# Patient Record
Sex: MALE | Race: WHITE
[De-identification: names, ages, dates, MRNs, and addresses within clinical notes are randomized per-mention and may not be internally consistent; named-entity substitution may affect disease eponyms.]

---

## 2017-06-12 ENCOUNTER — HOSPITAL ENCOUNTER (EMERGENCY)
Dept: HOSPITAL 17 - PHED | Age: 72
Discharge: HOME | End: 2017-06-12
Payer: COMMERCIAL

## 2017-06-12 VITALS
DIASTOLIC BLOOD PRESSURE: 69 MMHG | HEART RATE: 70 BPM | OXYGEN SATURATION: 99 % | RESPIRATION RATE: 18 BRPM | SYSTOLIC BLOOD PRESSURE: 137 MMHG

## 2017-06-12 VITALS
DIASTOLIC BLOOD PRESSURE: 59 MMHG | TEMPERATURE: 97.9 F | SYSTOLIC BLOOD PRESSURE: 110 MMHG | OXYGEN SATURATION: 96 % | RESPIRATION RATE: 17 BRPM | HEART RATE: 61 BPM

## 2017-06-12 VITALS
SYSTOLIC BLOOD PRESSURE: 129 MMHG | DIASTOLIC BLOOD PRESSURE: 75 MMHG | RESPIRATION RATE: 18 BRPM | HEART RATE: 76 BPM | OXYGEN SATURATION: 99 %

## 2017-06-12 VITALS
HEART RATE: 82 BPM | OXYGEN SATURATION: 98 % | DIASTOLIC BLOOD PRESSURE: 67 MMHG | RESPIRATION RATE: 18 BRPM | SYSTOLIC BLOOD PRESSURE: 117 MMHG

## 2017-06-12 VITALS — HEIGHT: 76 IN | WEIGHT: 213.85 LBS | BODY MASS INDEX: 26.04 KG/M2

## 2017-06-12 VITALS
OXYGEN SATURATION: 97 % | RESPIRATION RATE: 18 BRPM | HEART RATE: 85 BPM | SYSTOLIC BLOOD PRESSURE: 117 MMHG | DIASTOLIC BLOOD PRESSURE: 67 MMHG

## 2017-06-12 DIAGNOSIS — G25.81: ICD-10-CM

## 2017-06-12 DIAGNOSIS — G62.9: ICD-10-CM

## 2017-06-12 DIAGNOSIS — R63.4: ICD-10-CM

## 2017-06-12 DIAGNOSIS — R10.10: Primary | ICD-10-CM

## 2017-06-12 LAB
ALP SERPL-CCNC: 53 U/L (ref 45–117)
ALT SERPL-CCNC: 24 U/L (ref 12–78)
ANION GAP SERPL CALC-SCNC: 9 MEQ/L (ref 5–15)
AST SERPL-CCNC: 13 U/L (ref 15–37)
BACTERIA #/AREA URNS HPF: (no result) /HPF
BASOPHILS # BLD AUTO: 0 TH/MM3 (ref 0–0.2)
BASOPHILS NFR BLD: 0.2 % (ref 0–2)
BILIRUB SERPL-MCNC: 0.9 MG/DL (ref 0.2–1)
BUN SERPL-MCNC: 12 MG/DL (ref 7–18)
CHLORIDE SERPL-SCNC: 101 MEQ/L (ref 98–107)
COLOR UR: YELLOW
COMMENT (UR): (no result)
CULTURE IF INDICATED: (no result)
EOSINOPHIL # BLD: 0 TH/MM3 (ref 0–0.4)
EOSINOPHIL NFR BLD: 0.2 % (ref 0–4)
ERYTHROCYTE [DISTWIDTH] IN BLOOD BY AUTOMATED COUNT: 12.5 % (ref 11.6–17.2)
GFR SERPLBLD BASED ON 1.73 SQ M-ARVRAT: 84 ML/MIN (ref 89–?)
GLUCOSE UR STRIP-MCNC: (no result) MG/DL
HCO3 BLD-SCNC: 27.3 MEQ/L (ref 21–32)
HCT VFR BLD CALC: 44.9 % (ref 39–51)
HEMO FLAGS: (no result)
HGB UR QL STRIP: (no result)
KETONES UR STRIP-MCNC: (no result) MG/DL
LEUKOCYTE ESTERASE UR QL STRIP: (no result) /HPF (ref 0–5)
LYMPHOCYTES # BLD AUTO: 0.4 TH/MM3 (ref 1–4.8)
LYMPHOCYTES NFR BLD AUTO: 7.4 % (ref 9–44)
MCH RBC QN AUTO: 30.8 PG (ref 27–34)
MCHC RBC AUTO-ENTMCNC: 33.1 % (ref 32–36)
MCV RBC AUTO: 93.1 FL (ref 80–100)
METHOD OF COLLECTION: (no result)
MONOCYTES NFR BLD: 11.8 % (ref 0–8)
MUCOUS THREADS #/AREA URNS LPF: (no result) /LPF
NEUTROPHILS # BLD AUTO: 3.9 TH/MM3 (ref 1.8–7.7)
NEUTROPHILS NFR BLD AUTO: 80.4 % (ref 16–70)
NITRITE UR QL STRIP: (no result)
PLATELET # BLD: 205 TH/MM3 (ref 150–450)
POTASSIUM SERPL-SCNC: 4 MEQ/L (ref 3.5–5.1)
RBC # BLD AUTO: 4.83 MIL/MM3 (ref 4.5–5.9)
RBC #/AREA URNS HPF: (no result) /HPF (ref 0–3)
SODIUM SERPL-SCNC: 137 MEQ/L (ref 136–145)
SP GR UR STRIP: 1.02 (ref 1–1.03)
SQUAMOUS #/AREA URNS HPF: (no result) /HPF (ref 0–5)
WBC # BLD AUTO: 4.9 TH/MM3 (ref 4–11)

## 2017-06-12 PROCEDURE — 99285 EMERGENCY DEPT VISIT HI MDM: CPT

## 2017-06-12 PROCEDURE — 74177 CT ABD & PELVIS W/CONTRAST: CPT

## 2017-06-12 PROCEDURE — 85025 COMPLETE CBC W/AUTO DIFF WBC: CPT

## 2017-06-12 PROCEDURE — 81001 URINALYSIS AUTO W/SCOPE: CPT

## 2017-06-12 PROCEDURE — 83690 ASSAY OF LIPASE: CPT

## 2017-06-12 PROCEDURE — 80053 COMPREHEN METABOLIC PANEL: CPT

## 2017-06-12 PROCEDURE — 93005 ELECTROCARDIOGRAM TRACING: CPT

## 2017-06-12 NOTE — PD
HPI


Chief Complaint:  General Weakness


Time Seen by Provider:  10:46


Travel History


International Travel<30 days:  No


Contact w/Intl Traveler<30days:  No


Traveled to known affect area:  No





History of Present Illness


HPI


Patient is a 72-year-old male with history of restless leg syndrome/neuropathy 

and depression here with complaint of generalized fatigue/malaise, anorexia and 

weight loss.  For the last month patient has felt poorly.  Notes generalized 

fatigue, anorexia.  He's had an unintentional 30 pound weight loss.  He also 

notes fullness/discomfort in the upper abdomen, right slightly greater than 

left.  No noted jaundice, nausea, change in stool habits.  He denies any 

history of hepatobiliary pathology.  No changes in medications.  No nocturnal 

sweats, fevers or chills





PFSH


Past Medical History


Depression:  Yes


Cardiovascular Problems:  Yes (IDIOPATHIC NEUROPATHY BILAT LEGS)


Musculoskeletal:  Yes (RLS)


Influenza Vaccination:  Yes





Past Surgical History


Surgical History:  No Previous Surgery





Social History


Alcohol Use:  No


Tobacco Use:  No


Substance Use:  No





Allergies-Medications


(Allergen,Severity, Reaction):  


Coded Allergies:  


     Sulfa (Verified  Allergy, Intermediate, vomiting, 6/12/17)


Reported Meds & Prescriptions





Reported Meds & Active Scripts


Active


Reported


Duloxetine DR (Duloxetine HCl) 30 Mg Capdr 30 Mg PO BID


Montelukast (Montelukast Sodium) 10 Mg Tab 10 Mg PO HS


Atorvastatin (Atorvastatin Calcium) 40 Mg Tab 40 Mg PO HS


Lorazepam 1 Mg Tab 1 Mg PO Q8H PRN


Zolpidem (Zolpidem Tartrate) 10 Mg Tab 10 Mg PO HS


Trazodone (Trazodone HCl) 100 Mg Tablet 100 Mg PO HS








Review of Systems


Except as stated in HPI:  all other systems reviewed are Neg





Physical Exam


Narrative


GENERAL: Well-appearing male in no acute distress


SKIN: Focused skin assessment warm/dry.


HEAD:  Normocephalic. 


EYES:  No scleral icterus. No injection or drainage. 


ENT: No nasal bleeding or discharge.  Mucous membranes pink and moist.


NECK: Supple


CARDIOVASCULAR: Regular rate and rhythm.  No murmur appreciated.


RESPIRATORY: No accessory muscle use. Clear to auscultation. Breath sounds 

equal bilaterally. 


GASTROINTESTINAL: Abdomen soft, mild upper abdominal tenderness to palpation in 

the epigastrium and right upper quadrant without rebound or guarding, no 

hepatosplenomegaly


MUSCULOSKELETAL: normal gait


NEUROLOGICAL: Awake and alert.  Motor grossly within normal limits. Normal 

speech.


PSYCHIATRIC: Appropriate mood and affect; insight and judgment normal.





Data


Data


Last Documented VS





Vital Signs








  Date Time  Temp Pulse Resp B/P Pulse Ox O2 Delivery O2 Flow Rate FiO2


 


6/12/17 12:47  70 18 137/69 99 Room Air  


 


6/12/17 10:01 97.9       








Orders





 Electrocardiogram (6/12/17 10:39)


Complete Blood Count With Diff (6/12/17 10:39)


Urinalysis - C+S If Indicated (6/12/17 10:39)


Ecg Monitoring (6/12/17 10:39)


Iv Access Insert/Monitor (6/12/17 10:39)


Oximetry (6/12/17 10:39)


Sodium Chloride 0.9% Flush (Ns Flush) (6/12/17 10:45)


Comprehensive Metabolic Panel (6/12/17 10:52)


Lipase (6/12/17 10:52)


Ct Abd/Pel W Iv Contrast(Rout) (6/12/17 10:52)


Diatrizoate Liq (Md Gastroview Liq) (6/12/17 10:55)


Oral Contrast - Adult (6/12/17 11:09)


Iohexol 350 Inj (Omnipaque 350 Inj) (6/12/17 12:37)





Labs





 Laboratory Tests








Test 6/12/17 6/12/17





 11:00 11:10


 


White Blood Count 4.9 TH/MM3 


 


Red Blood Count 4.83 MIL/MM3 


 


Hemoglobin 14.9 GM/DL 


 


Hematocrit 44.9 % 


 


Mean Corpuscular Volume 93.1 FL 


 


Mean Corpuscular Hemoglobin 30.8 PG 


 


Mean Corpuscular Hemoglobin 33.1 % 





Concent  


 


Red Cell Distribution Width 12.5 % 


 


Platelet Count 205 TH/MM3 


 


Mean Platelet Volume 7.2 FL 


 


Neutrophils (%) (Auto) 80.4 % 


 


Lymphocytes (%) (Auto) 7.4 % 


 


Monocytes (%) (Auto) 11.8 % 


 


Eosinophils (%) (Auto) 0.2 % 


 


Basophils (%) (Auto) 0.2 % 


 


Neutrophils # (Auto) 3.9 TH/MM3 


 


Lymphocytes # (Auto) 0.4 TH/MM3 


 


Monocytes # (Auto) 0.6 TH/MM3 


 


Eosinophils # (Auto) 0.0 TH/MM3 


 


Basophils # (Auto) 0.0 TH/MM3 


 


CBC Comment DIFF FINAL  


 


Differential Comment   


 


Sodium Level 137 MEQ/L 


 


Potassium Level 4.0 MEQ/L 


 


Chloride Level 101 MEQ/L 


 


Carbon Dioxide Level 27.3 MEQ/L 


 


Anion Gap 9 MEQ/L 


 


Blood Urea Nitrogen 12 MG/DL 


 


Creatinine 0.89 MG/DL 


 


Estimat Glomerular Filtration 84 ML/MIN 





Rate  


 


Random Glucose 131 MG/DL 


 


Calcium Level 9.3 MG/DL 


 


Total Bilirubin 0.9 MG/DL 


 


Aspartate Amino Transf 13 U/L 





(AST/SGOT)  


 


Alanine Aminotransferase 24 U/L 





(ALT/SGPT)  


 


Alkaline Phosphatase 53 U/L 


 


Total Protein 6.5 GM/DL 


 


Albumin 3.8 GM/DL 


 


Lipase 256 U/L 


 


Urine Collection Type  VOIDED 


 


Urine Color  YELLOW 


 


Urine Turbidity  CLEAR 


 


Urine pH  6.0 


 


Urine Specific Gravity  1.024 


 


Urine Protein  NEG mg/dL


 


Urine Glucose (UA)  NEG mg/dL


 


Urine Ketones  NEG mg/dL


 


Urine Occult Blood  TRACE 


 


Urine Nitrite  NEG 


 


Urine Bilirubin  NEG 


 


Urine Leukocyte Esterase  NEG 


 


Urine RBC  0-3 /hpf


 


Urine WBC  0-2 /hpf


 


Urine Squamous Epithelial  0-2 /hpf





Cells  


 


Urine Bacteria  RARE /hpf


 


Urine Mucus  MANY /lpf


 


Microscopic Urinalysis Comment  CULT NOT





  INDICATED











MDM


Medical Decision Making


Medical Screen Exam Complete:  Yes


Emergency Medical Condition:  Yes


Medical Record Reviewed:  Yes


Differential Diagnosis


72-year-old male with history of depression here with 1 month of generalized 

fatigue/malaise, anorexia and unintentional 30 pound weight loss with upper 

abdominal discomfort.  Differential includes gastritis, pancreatitis, 

hepatobiliary pathology, mass within the colon, pancreatitis, liver cancer, 

dehydration, electrolyte abnormality, symptomatic anemia, depression


Narrative Course


Patient placed on monitor, IV established and blood obtained.  A twelve-lead 

EKG shows sinus rhythm without notable ST or T-wave abnormalities and normal 

intervals.  CBC, CMP, lipase and urinalysis were unremarkable.  CT of the 

abdomen and pelvis was normal.  Patient reassured and encouraged to establish 

care with outpatient primary care provider for further workup and management of 

his symptoms.





Diagnosis





 Primary Impression:  


 Unintentional weight loss


 Additional Impression:  


 Abdominal pain


 Qualified Code:  R10.10 - Pain of upper abdomen


Referrals:  


[facility]


call for appointment





Primary Care Physician


call for appointment





***Additional Instructions:


Laboratory workup and CT of the abdomen and pelvis today was normal.  





Follow-up with primary care provider for further outpatient workup


***Med/Other Pt SpecificInfo:  No Change to Meds


Disposition:  01 DISCHARGE HOME


Condition:  Stable








Jewell Sarmiento MD Jun 12, 2017 13:04

## 2017-06-12 NOTE — RADHPO
EXAM DATE/TIME:  06/12/2017 12:25 

 

[facility] COMPARISON:     

No previous studies available for comparison.

 

 

INDICATIONS :     

Thirty pound weight loss within one month.

                      

 

IV CONTRAST:     

95 cc Omnipaque 350 (iohexol) IV 

 

 

ORAL CONTRAST:      

Prescribed oral contrast ingested.

                      

 

RADIATION DOSE:     

18.19 CTDIvol (mGy) 

 

 

MEDICAL HISTORY :      

None  

 

SURGICAL HISTORY :       

None. 

 

ENCOUNTER:      

Initial

 

ACUITY:      

1 month

 

PAIN SCALE:      

0/10

 

LOCATION:       

Abdomen/pelvis

 

TECHNIQUE:     

Volumetric scanning of the abdomen and pelvis was performed.  Using automated exposure control and ad
justment of the mA and/or kV according to patient size, radiation dose was kept as low as reasonably 
achievable to obtain optimal diagnostic quality images. 

 

FINDINGS:     

Lung bases are clear.  Liver is free of focal defects.  Spleen contains granulomas.  Pancreas, adrena
ls and kidneys are unremarkable.  There is no ascites or adenopathy appreciated.  

 

Pelvic contents are unremarkable.  

 

CONCLUSION:     

1.  I do not see an etiology for the 30-pound weight loss. 

2.  There is collateralization from a supreme intercostal vein to the azygous vein.  

 

 

 Linden Lopes MD FACR on June 12, 2017 at 12:42                

Board Certified Radiologist.

 This report was verified electronically.

## 2017-06-12 NOTE — EKG
Date Performed: 06/12/2017       Time Performed: 10:50:12

 

PTAGE:      72 years

 

EKG:      Sinus rhythm 

 

 NORMAL ECG 

 

NO PREVIOUS TRACING            

 

DOCTOR:   Jabier Torres  Interpretating Date/Time  06/12/2017 13:37:54

## 2017-07-17 ENCOUNTER — HOSPITAL ENCOUNTER (INPATIENT)
Dept: HOSPITAL 17 - PHED | Age: 72
LOS: 1 days | Discharge: HOME | DRG: 641 | End: 2017-07-18
Attending: HOSPITALIST | Admitting: HOSPITALIST
Payer: COMMERCIAL

## 2017-07-17 VITALS
OXYGEN SATURATION: 96 % | RESPIRATION RATE: 16 BRPM | HEART RATE: 96 BPM | DIASTOLIC BLOOD PRESSURE: 94 MMHG | SYSTOLIC BLOOD PRESSURE: 169 MMHG

## 2017-07-17 VITALS
SYSTOLIC BLOOD PRESSURE: 99 MMHG | HEART RATE: 99 BPM | OXYGEN SATURATION: 95 % | DIASTOLIC BLOOD PRESSURE: 67 MMHG | RESPIRATION RATE: 20 BRPM | TEMPERATURE: 97.8 F

## 2017-07-17 VITALS
RESPIRATION RATE: 16 BRPM | OXYGEN SATURATION: 96 % | DIASTOLIC BLOOD PRESSURE: 72 MMHG | SYSTOLIC BLOOD PRESSURE: 120 MMHG | HEART RATE: 83 BPM

## 2017-07-17 VITALS — BODY MASS INDEX: 23.6 KG/M2 | HEIGHT: 76 IN | WEIGHT: 193.79 LBS

## 2017-07-17 VITALS
OXYGEN SATURATION: 99 % | RESPIRATION RATE: 16 BRPM | SYSTOLIC BLOOD PRESSURE: 160 MMHG | DIASTOLIC BLOOD PRESSURE: 83 MMHG | HEART RATE: 85 BPM

## 2017-07-17 VITALS — HEART RATE: 93 BPM | TEMPERATURE: 97.8 F | DIASTOLIC BLOOD PRESSURE: 88 MMHG | SYSTOLIC BLOOD PRESSURE: 147 MMHG

## 2017-07-17 VITALS — OXYGEN SATURATION: 97 % | DIASTOLIC BLOOD PRESSURE: 66 MMHG | HEART RATE: 84 BPM | SYSTOLIC BLOOD PRESSURE: 114 MMHG

## 2017-07-17 VITALS — OXYGEN SATURATION: 98 % | SYSTOLIC BLOOD PRESSURE: 98 MMHG | HEART RATE: 87 BPM | DIASTOLIC BLOOD PRESSURE: 55 MMHG

## 2017-07-17 VITALS — OXYGEN SATURATION: 96 %

## 2017-07-17 DIAGNOSIS — R29.6: ICD-10-CM

## 2017-07-17 DIAGNOSIS — E46: ICD-10-CM

## 2017-07-17 DIAGNOSIS — K59.00: ICD-10-CM

## 2017-07-17 DIAGNOSIS — G25.81: ICD-10-CM

## 2017-07-17 DIAGNOSIS — F33.1: ICD-10-CM

## 2017-07-17 DIAGNOSIS — G60.9: ICD-10-CM

## 2017-07-17 DIAGNOSIS — I95.9: ICD-10-CM

## 2017-07-17 DIAGNOSIS — R62.7: ICD-10-CM

## 2017-07-17 DIAGNOSIS — E86.0: Primary | ICD-10-CM

## 2017-07-17 DIAGNOSIS — R10.9: ICD-10-CM

## 2017-07-17 LAB
ALP SERPL-CCNC: 71 U/L (ref 45–117)
ALT SERPL-CCNC: 93 U/L (ref 12–78)
ANION GAP SERPL CALC-SCNC: 8 MEQ/L (ref 5–15)
APTT BLD: 24.6 SEC (ref 24.3–30.1)
AST SERPL-CCNC: 37 U/L (ref 15–37)
BASOPHILS # BLD AUTO: 0 TH/MM3 (ref 0–0.2)
BASOPHILS NFR BLD: 0.2 % (ref 0–2)
BILIRUB SERPL-MCNC: 1.2 MG/DL (ref 0.2–1)
BUN SERPL-MCNC: 19 MG/DL (ref 7–18)
CHLORIDE SERPL-SCNC: 104 MEQ/L (ref 98–107)
COLOR UR: (no result)
COMMENT (UR): (no result)
CULTURE IF INDICATED: (no result)
EOSINOPHIL # BLD: 0 TH/MM3 (ref 0–0.4)
EOSINOPHIL NFR BLD: 0.1 % (ref 0–4)
ERYTHROCYTE [DISTWIDTH] IN BLOOD BY AUTOMATED COUNT: 12.9 % (ref 11.6–17.2)
GFR SERPLBLD BASED ON 1.73 SQ M-ARVRAT: 87 ML/MIN (ref 89–?)
GLUCOSE UR STRIP-MCNC: (no result) MG/DL
HCO3 BLD-SCNC: 26.1 MEQ/L (ref 21–32)
HCT VFR BLD CALC: 47 % (ref 39–51)
HEMO FLAGS: (no result)
HGB UR QL STRIP: (no result)
INR PPP: 1 RATIO
KETONES UR STRIP-MCNC: (no result) MG/DL
LEUKOCYTE ESTERASE UR QL STRIP: (no result) /HPF (ref 0–5)
LYMPHOCYTES # BLD AUTO: 0.4 TH/MM3 (ref 1–4.8)
LYMPHOCYTES NFR BLD AUTO: 7.1 % (ref 9–44)
MCH RBC QN AUTO: 29.9 PG (ref 27–34)
MCHC RBC AUTO-ENTMCNC: 32.5 % (ref 32–36)
MCV RBC AUTO: 92 FL (ref 80–100)
MONOCYTES NFR BLD: 9.5 % (ref 0–8)
MUCOUS THREADS #/AREA URNS LPF: (no result) /LPF
NEUTROPHILS # BLD AUTO: 4.9 TH/MM3 (ref 1.8–7.7)
NEUTROPHILS NFR BLD AUTO: 83.1 % (ref 16–70)
NITRITE UR QL STRIP: (no result)
PLATELET # BLD: 209 TH/MM3 (ref 150–450)
POTASSIUM SERPL-SCNC: 4.1 MEQ/L (ref 3.5–5.1)
PROTHROMBIN TIME: 10.7 SEC (ref 9.8–11.6)
RBC # BLD AUTO: 5.11 MIL/MM3 (ref 4.5–5.9)
RBC #/AREA URNS HPF: (no result) /HPF (ref 0–3)
SCAN/DIFF: (no result)
SODIUM SERPL-SCNC: 138 MEQ/L (ref 136–145)
SP GR UR STRIP: 1.03 (ref 1–1.03)
SQUAMOUS #/AREA URNS HPF: (no result) /HPF (ref 0–5)
WBC # BLD AUTO: 5.9 TH/MM3 (ref 4–11)

## 2017-07-17 PROCEDURE — 80053 COMPREHEN METABOLIC PANEL: CPT

## 2017-07-17 PROCEDURE — 81001 URINALYSIS AUTO W/SCOPE: CPT

## 2017-07-17 PROCEDURE — 85730 THROMBOPLASTIN TIME PARTIAL: CPT

## 2017-07-17 PROCEDURE — 83690 ASSAY OF LIPASE: CPT

## 2017-07-17 PROCEDURE — 74000: CPT

## 2017-07-17 PROCEDURE — 74177 CT ABD & PELVIS W/CONTRAST: CPT

## 2017-07-17 PROCEDURE — 85610 PROTHROMBIN TIME: CPT

## 2017-07-17 PROCEDURE — 85025 COMPLETE CBC W/AUTO DIFF WBC: CPT

## 2017-07-17 PROCEDURE — 80048 BASIC METABOLIC PNL TOTAL CA: CPT

## 2017-07-17 PROCEDURE — 84443 ASSAY THYROID STIM HORMONE: CPT

## 2017-07-17 PROCEDURE — 70450 CT HEAD/BRAIN W/O DYE: CPT

## 2017-07-17 PROCEDURE — 93005 ELECTROCARDIOGRAM TRACING: CPT

## 2017-07-17 RX ADMIN — DULOXETINE HYDROCHLORIDE SCH MG: 30 CAPSULE, DELAYED RELEASE ORAL at 22:42

## 2017-07-17 RX ADMIN — Medication SCH TAB: at 22:42

## 2017-07-17 RX ADMIN — DOCUSATE SODIUM SCH MG: 100 CAPSULE, LIQUID FILLED ORAL at 22:42

## 2017-07-17 RX ADMIN — WATER SCH ML: 1 IRRIGANT IRRIGATION at 22:42

## 2017-07-17 RX ADMIN — PHENYTOIN SODIUM SCH MLS/HR: 50 INJECTION INTRAMUSCULAR; INTRAVENOUS at 20:57

## 2017-07-17 NOTE — RADRPT
EXAM DATE/TIME:  07/17/2017 17:19 

 

[facility] COMPARISON:     

No previous studies available for comparison.

 

 

INDICATIONS :     

Increased weakness. Abdominal pain.

                      

 

IV CONTRAST:     

95 cc Omnipaque 350 (iohexol) IV 

 

 

ORAL CONTRAST:      

No oral contrast ingested.

                      

 

RADIATION DOSE:     

19.79 CTDIvol (mGy) 

 

 

MEDICAL HISTORY :     

None  

 

SURGICAL HISTORY :      

None. 

 

ENCOUNTER:      

Initial

 

ACUITY:      

2 months

 

PAIN SCALE:      

2/10

 

LOCATION:         

abdomen

 

TECHNIQUE:     

Volumetric scanning of the abdomen and pelvis was performed.  Using automated exposure control and ad
justment of the mA and/or kV according to patient size, radiation dose was kept as low as reasonably 
achievable to obtain optimal diagnostic quality images.  DICOM format image data is available electro
nically for review and comparison.  

 

FINDINGS:     

 

LOWER LUNGS:     

The visualized lower lungs are clear. Epicardial pacer wires in good position

 

LIVER:     

Homogeneous density without lesion.  There is no dilation of the biliary tree.  No calcified gallston
es.

 

SPLEEN:     

Normal size without lesion. Numerous granuloma

 

PANCREAS:     

Within normal limits.

 

KIDNEYS:     

Normal in size and shape.  There is no mass, stone or hydronephrosis.

 

ADRENAL GLANDS:     

Within normal limits.

 

VASCULAR:     

There is no aortic aneurysm.

 

BOWEL/MESENTERY:     

The stomach, small bowel, and colon demonstrate no acute abnormality.  There is no free intraperitone
al air or fluid.

 

ABDOMINAL WALL:     

Within normal limits.

 

RETROPERITONEUM:     

There is no lymphadenopathy. 

 

BLADDER:     

No wall thickening or mass. 

 

REPRODUCTIVE:     

Within normal limits.

 

INGUINAL:     

There is no lymphadenopathy or hernia. 

 

MUSCULOSKELETAL:     

Within normal limits for patient age. 

 

CONCLUSION:     

Normal examination. Moderate stool throughout the colon. 

 

 

 

 Sheldon Flores MD on July 17, 2017 at 17:39           

Board Certified Radiologist.

 This report was verified electronically.

## 2017-07-17 NOTE — HHI.HP
__________________________________________________





[facility]


Service


[facility]


Primary Care Physician


Unknown


Admission Diagnosis


hypotension, failure to thrive, falls, depression


Diagnoses:  


Chief Complaint:  


Weakness and abdominal pain


Travel History


International Travel<30 Days:  No


Contact w/Intl Traveler <30 Da:  No


Traveled to Known Affected Are:  No


History of Present Illness


This patient is a 72-year-old gentleman with history of depression who has come 

to the hospital with 2 weeks of no bowel movements despite taking MiraLAX and 

suppositories at home.  He has been generally weak due to not eating.  He says 

his stomach feels full.  His pain is moderate and not improved with any 

measures.  His wife accompanies him and says he's lost at least 7 pounds in a 

week from not eating.  He has been nauseated but not vomiting.  Patient denies 

any fevers or chills.  He says that he has been sleeping more and his wife is 

concerned that he is unmotivated and has not been out of the house in 20 days.  

Normally the patient takes trazodone, zolpidem and Eloxatin as well as 

lorazepam.  His primary doctor for anxiety and depression.  He has been 

sleeping poorly..  Patient denies any suicidal or homicidal ideation.  The 

patient is severely dehydrated on exam with some tenting of his skin and dry 

mucous membranes.  He is hypotensive.  Patient will need to be admitted to the 

hospital for further evaluation of severe dehydration and malnourishment





Review of Systems


Constitutional:  COMPLAINS OF: Weight loss (unintentional and due to not 

eating.  Patient),  DENIES: Diaphoretic episodes, Fatigue, Fever, Weight gain, 

Chills, Dizziness, Change in appetite, Night Sweats


Endocrine:  DENIES: Heat/cold intolerance, Polydipsia, Polyuria, Polyphagia


Eyes:  DENIES: Blurred vision, Diplopia, Eye inflammation, Eye pain, Vision loss

, Photosensitivity, Double Vision


Ears, nose, mouth, throat:  DENIES: Tinnitus, Hearing loss, Vertigo, Nasal 

discharge, Oral lesions, Throat pain, Hoarseness, Ear Pain, Running Nose, 

Epistaxis, Sinus Pain, Toothache, Odynophagia


Respiratory:  DENIES: Apneas, Cough, Snoring, Wheezing, Hemoptysis, Sputum 

production, Shortness of breath


Cardiovascular:  DENIES: Chest pain, Palpitations, Syncope, Dyspnea on Exertion

, PND, Lower Extremity Edema, Orthopnea, Claudication


Gastrointestinal:  COMPLAINS OF: Abdominal pain, Constipation, Nausea


Genitourinary:  DENIES: Sexual dysfunction, Urinary frequency, Urinary 

incontinence, Urgency, Hematuria, Dysuria, Nocturia, Penile Discharge, 

Testicular Pain, Testicular Swelling


Musculoskeletal:  DENIES: Joint pain, Muscle aches, Stiffness, Joint Swelling, 

Back pain, Neck pain


Integumentary:  DENIES: Abnormal pigmentation, Nail changes, Pruritus, Rash


Hematologic/lymphatic:  DENIES: Bruising, Lymphadenopathy


Immunologic/allergic:  DENIES: Eczema, Urticaria


Neurologic:  DENIES: Abnormal gait, Headache, Localized weakness, Paresthesias, 

Seizures, Speech Problems, Tremor, Poor Balance


Psychiatric:  COMPLAINS OF: Anxiety, Depression (patient denies suicidal or 

homicidal ideation),  DENIES: Confusion, Mood changes, Hallucinations, Agitation

, Suicidal Ideation, Homicidal Ideation, Delusions





Past Family Social History


Past Medical History


Depression


Hyperlipidemia


Neuropathy 


history of melanoma


Past Surgical History


Anxiety


Reported Medications


Reviewed in the medical record, patient does not take his statin


Allergies:  


Coded Allergies:  


     Sulfa (Verified  Allergy, Intermediate, vomiting, 17)


Active Ordered Medications


Reviewed in the medical record


Family History


Mother  at 92 and Alzheimer's, father  at 92


Social History


, no tobacco or alcohol dependency





Physical Exam


Vital Signs





 Vital Signs








  Date Time  Temp Pulse Resp B/P Pulse Ox O2 Delivery O2 Flow Rate FiO2


 


17 18:13  87  98/55 98   


 


17 17:18  84  114/66 97   


 


17 16:10     96   


 


17 15:04 97.8 99 20 99/67 95   








Physical Exam


GENERAL: This is a well-nourished, well-developed patient, with flat affect


SKIN: Skin is cool and dry and there is multiple areas of hypopigmentation was 

a patient says his vitiligo


HEAD: Atraumatic. Normocephalic. No temporal or scalp tenderness.


EYES: Pupils equal round and reactive. Extraocular motions intact. No scleral 

icterus. No injection or drainage. 


ENT: Nose without bleeding, purulent drainage or septal hematoma. Throat 

without erythema, tonsillar hypertrophy or exudate. Uvula midline. Airway 

patent.


NECK: Trachea midline. No JVD or lymphadenopathy. Supple, nontender, no 

meningeal signs.


CARDIOVASCULAR: Regular rate and rhythm without murmurs, gallops, or rubs. 


RESPIRATORY: Clear to auscultation. Breath sounds equal bilaterally. No wheezes

, rales, or rhonchi.  


GASTROINTESTINAL: Abdomen soft, non-tender, mildly distended hypoactive bowel 

sounds. No hepato-splenomegaly, or palpable masses. No guarding.


MUSCULOSKELETAL: Extremities without clubbing, cyanosis, or edema. No joint 

tenderness, effusion, or edema noted. No calf tenderness. Negative Homans sign 

bilaterally.


NEUROLOGICAL: Awake and alert. Cranial nerves II through XII intact.  Motor and 

sensory grossly within normal limits. Five out of 5 muscle strength in all 

muscle groups.  Normal speech.


Laboratory





Laboratory Tests








Test 17





 16:27 17:49


 


White Blood Count 5.9  


 


Red Blood Count 5.11  


 


Hemoglobin 15.3  


 


Hematocrit 47.0  


 


Mean Corpuscular Volume 92.0  


 


Mean Corpuscular Hemoglobin 29.9  


 


Mean Corpuscular Hemoglobin 32.5  





Concent  


 


Red Cell Distribution Width 12.9  


 


Platelet Count 209  


 


Mean Platelet Volume 8.5  


 


Neutrophils (%) (Auto) 83.1  


 


Lymphocytes (%) (Auto) 7.1  


 


Monocytes (%) (Auto) 9.5  


 


Eosinophils (%) (Auto) 0.1  


 


Basophils (%) (Auto) 0.2  


 


Neutrophils # (Auto) 4.9  


 


Lymphocytes # (Auto) 0.4  


 


Monocytes # (Auto) 0.6  


 


Eosinophils # (Auto) 0.0  


 


Basophils # (Auto) 0.0  


 


CBC Comment AUTO DIFF  


 


Differential Comment AUTO DIFF 





 CONFIRMED 


 


Prothrombin Time 10.7  


 


Prothromb Time International 1.0  





Ratio  


 


Activated Partial 24.6  





Thromboplast Time  


 


Sodium Level 138  


 


Potassium Level 4.1  


 


Chloride Level 104  


 


Carbon Dioxide Level 26.1  


 


Anion Gap 8  


 


Blood Urea Nitrogen 19  


 


Creatinine 0.87  


 


Estimat Glomerular Filtration 87  





Rate  


 


Random Glucose 92  


 


Calcium Level 9.4  


 


Total Bilirubin 1.2  


 


Aspartate Amino Transf 37  





(AST/SGOT)  


 


Alanine Aminotransferase 93  





(ALT/SGPT)  


 


Alkaline Phosphatase 71  


 


Total Protein 7.0  


 


Albumin 3.8  


 


Lipase 138  


 


Urine pH  6.0 


 


Urine Protein  NEG 


 


Urine Glucose (UA)  NEG 


 


Urine Ketones  TRACE 


 


Urine Occult Blood  NEG 


 


Urine Nitrite  NEG 


 


Urine Bilirubin  NEG 


 


Urine Leukocyte Esterase  NEG 








Result Diagram:  


17





Imaging





Last Impressions








Abdomen/Pelvis CT 17 1607 Signed





Impressions: 





 Service Date/Time:  2017 17:19 - CONCLUSION:  Normal 





 examination. Moderate stool throughout the colon.      Sheldon Flores MD 


 


Head CT 17 0000 Signed





Impressions: 





 Service Date/Time:  Monday, 2017 17:15 - CONCLUSION:  Normal 





 examination.       Sheldon Flores MD 











Assessment and Plan


Problem List:  


(1) Hypotension


ICD Code:  I95.9


Status:  Acute


Plan:  Patient is severely dehydrated clinically and will need IV hydration.  

He appears to be malnourished and we will consult nutrition





(2) Depression


ICD Code:  F32.9


Status:  Acute


Plan:  Patient on Cymbalta and multiple benzos/sleep aides


Patient with increased anhedonia, appetite and flat affect on exam


Psychiatry consult pending


tsh pending





(3) Abdominal pain


ICD Code:  R10.9


Status:  Acute


Plan:  Likely due to constipation which may be medication related.  Continue 

with bowel regimen and stool softeners





(4) Obstipation


ICD Code:  K59.00


Status:  Acute


Plan:  Patient with significant decrease in bowel movements for 2 weeks and 

stool in his colon.  We will attempt medical management of this for now and 

follow for resolution





Assessment and Plan


Plan of care to be determined by Hospital course


Code Status


full code


Discussed Condition With


Patient, spouse, nursing team and ER M.D.





Physician Certification


2 Midnight Certification Type:  Admission for Inpatient Services


Order for Inpatient Services


The services are ordered in accordance with Medicare regulations or non-

Medicare payer requirements, as applicable.  In the case of services not 

specified as inpatient-only, they are appropriately provided as inpatient 

services in accordance with the 2-midnight benchmark.


Estimated LOS (days):  2


2 days is the estimated time the patient will need to remain in the hospital, 

assuming treatment plan goals are met and no additional complications.


Post-Hospital Plan:  Not yet determined





Problem Qualifiers





(1) Hypotension:  


Qualified Code:  I95.9 - Hypotension, unspecified hypotension type


(2) Depression:  


Qualified Code:  F32.9 - Depression, unspecified depression type





Bekah Nuñez MD 2017 18:56

## 2017-07-17 NOTE — PD
HPI


Chief Complaint:  General Weakness


Time Seen by Provider:  16:02


Travel History


International Travel<30 days:  No


Contact w/Intl Traveler<30days:  No


Traveled to known affect area:  No





History of Present Illness


HPI


72-year-old male with history of restless leg syndrome, neuropathy, depression, 

here with complaint of generalized fatigue and malaise, anorexia, weight loss, 

generalized weakness.  Patient was at the symptoms of [facility] [place] for last 2 

months.  His wife is here and states that he has had little appetite and has 

only been drinking Ensure.  Patient is complaining of feeling abdominal 

fullness.  He has not had fevers or chills.  No vomiting.  He reports that he 

has not had a bowel movement in several days.  Chart review shows that the 

patient was seen in the emergency department about one month ago for the same.  

At that time his charted weight was 97 kg.  Today is charted weight is 91 

kilograms.  Patient does admit to feeling depressed, however he denies suicidal 

or homicidal ideation.  He states that he fell in the shower a couple weeks ago 

and shows me a bruise on his left arm.  He states he feels too weak to walk 

around, and his wife is certainly not strong enough to take care of him at home.





PFSH


Past Medical History


Anxiety:  Yes


Depression:  Yes


Cardiovascular Problems:  Yes (IDIOPATHIC NEUROPATHY BILAT LEGS)


High Cholesterol:  Yes


Diminished Hearing:  No


Musculoskeletal:  Yes (RLS)


Tetanus Vaccination:  Unknown


Pregnant?:  Not Pregnant





Social History


Alcohol Use:  No


Tobacco Use:  No


Substance Use:  No





Allergies-Medications


(Allergen,Severity, Reaction):  


Coded Allergies:  


     Sulfa (Verified  Allergy, Intermediate, vomiting, 7/17/17)


Reported Meds & Prescriptions





Reported Meds & Active Scripts


Active


Reported


Duloxetine DR (Duloxetine HCl) 30 Mg Capdr 30 Mg PO BID


Atorvastatin (Atorvastatin Calcium) 40 Mg Tab 40 Mg PO HS


Lorazepam 1 Mg Tab 1 Mg PO Q8H PRN


Zolpidem (Zolpidem Tartrate) 10 Mg Tab 10 Mg PO HS


Trazodone (Trazodone HCl) 100 Mg Tablet 100 Mg PO HS








Review of Systems


Except as stated in HPI:  all other systems reviewed are Neg





Physical Exam


Narrative


GENERAL: Well-developed, well-nourished, awake, alert, GCS 15


SKIN: Focused skin assessment warm/dry.  Ecchymosis to medial left arm in 

various stages of healing.  No lacerations or abrasions.


HEAD: Atraumatic. Normocephalic. 


EYES: Pupils equal and round. No scleral icterus. No injection or drainage. 


ENT: Mucous membranes pink and moist.


NECK: Trachea midline. No JVD. 


CARDIOVASCULAR: Regular rate and rhythm.  


RESPIRATORY: No accessory muscle use. Clear to auscultation. Breath sounds 

equal bilaterally. 


GASTROINTESTINAL: Abdomen soft, non-tender, nondistended. 


MUSCULOSKELETAL: No obvious deformities. No clubbing.  No cyanosis.  No edema. 


NEUROLOGICAL: Awake and alert. No obvious cranial nerve deficits.  Motor 

grossly within normal limits. Normal speech.  No focal deficits.


PSYCHIATRIC: Appropriate mood and affect; insight and judgment normal.





Data


Data


Last Documented VS





Vital Signs








  Date Time  Temp Pulse Resp B/P Pulse Ox O2 Delivery O2 Flow Rate FiO2


 


7/17/17 18:13  87  98/55 98   


 


7/17/17 15:04 97.8  20     








Orders





 Complete Blood Count With Diff (7/17/17 16:07)


Comprehensive Metabolic Panel (7/17/17 16:07)


Lipase (7/17/17 16:07)


Prothrombin Time / Inr (Pt) (7/17/17 16:07)


Act Partial Throm Time (Ptt) (7/17/17 16:07)


Urinalysis - C+S If Indicated (7/17/17 16:07)


Ct Abd/Pel W Iv Contrast(Rout) (7/17/17 16:07)


Iv Access Insert/Monitor (7/17/17 16:07)


Ecg Monitoring (7/17/17 16:07)


Oximetry (7/17/17 16:07)


Sodium Chloride 0.9% Flush (Ns Flush) (7/17/17 16:15)


Electrocardiogram (7/17/17 16:07)


Ct Brain W/O Iv Contrast(Rout) (7/17/17 )


Cath For Specimen (7/17/17 17:20)


Iohexol 350 Inj (Omnipaque 350 Inj) (7/17/17 17:32)





Labs





 Laboratory Tests








Test 7/17/17 7/17/17





 16:27 17:49


 


White Blood Count 5.9 TH/MM3 


 


Red Blood Count 5.11 MIL/MM3 


 


Hemoglobin 15.3 GM/DL 


 


Hematocrit 47.0 % 


 


Mean Corpuscular Volume 92.0 FL 


 


Mean Corpuscular Hemoglobin 29.9 PG 


 


Mean Corpuscular Hemoglobin 32.5 % 





Concent  


 


Red Cell Distribution Width 12.9 % 


 


Platelet Count 209 TH/MM3 


 


Mean Platelet Volume 8.5 FL 


 


Neutrophils (%) (Auto) 83.1 % 


 


Lymphocytes (%) (Auto) 7.1 % 


 


Monocytes (%) (Auto) 9.5 % 


 


Eosinophils (%) (Auto) 0.1 % 


 


Basophils (%) (Auto) 0.2 % 


 


Neutrophils # (Auto) 4.9 TH/MM3 


 


Lymphocytes # (Auto) 0.4 TH/MM3 


 


Monocytes # (Auto) 0.6 TH/MM3 


 


Eosinophils # (Auto) 0.0 TH/MM3 


 


Basophils # (Auto) 0.0 TH/MM3 


 


CBC Comment AUTO DIFF  


 


Differential Comment AUTO DIFF 





 CONFIRMED 


 


Prothrombin Time 10.7 SEC 


 


Prothromb Time International 1.0 RATIO 





Ratio  


 


Activated Partial 24.6 SEC 





Thromboplast Time  


 


Sodium Level 138 MEQ/L 


 


Potassium Level 4.1 MEQ/L 


 


Chloride Level 104 MEQ/L 


 


Carbon Dioxide Level 26.1 MEQ/L 


 


Anion Gap 8 MEQ/L 


 


Blood Urea Nitrogen 19 MG/DL 


 


Creatinine 0.87 MG/DL 


 


Estimat Glomerular Filtration 87 ML/MIN 





Rate  


 


Random Glucose 92 MG/DL 


 


Calcium Level 9.4 MG/DL 


 


Total Bilirubin 1.2 MG/DL 


 


Aspartate Amino Transf 37 U/L 





(AST/SGOT)  


 


Alanine Aminotransferase 93 U/L 





(ALT/SGPT)  


 


Alkaline Phosphatase 71 U/L 


 


Total Protein 7.0 GM/DL 


 


Albumin 3.8 GM/DL 


 


Lipase 138 U/L 


 


Urine pH  6.0 


 


Urine Protein  NEG mg/dL


 


Urine Glucose (UA)  NEG mg/dL


 


Urine Ketones  TRACE mg/dL


 


Urine Occult Blood  NEG 


 


Urine Nitrite  NEG 


 


Urine Bilirubin  NEG 


 


Urine Leukocyte Esterase  NEG 











[facility] [place]


Medical Decision Making


Medical Screen Exam Complete:  Yes


Emergency Medical Condition:  Yes


Differential Diagnosis


Failure to thrive, metabolic abnormality, cancer, depression, starvation, 

malnourished


Narrative Course


Vital signs reviewed.





CBC shows WBC 5.9, hemoglobin 15.3, hematocrit 47, platelets 209, neutrophils 83

%.


CMP is unremarkable.


Lipase is 138.


UA





CT head:


Normal exam.





CT abdomen pelvis:


Normal exam.  Moderate stool throughout the colon.





Patient was made aware of all findings.  He is resting comfortably, however he 

states he feels too weak to even attempt to walk in the emergency department.  

There are no focal neuro deficits on exam.  Patient was seen in the emergency 

department one month ago and at that time weight 97 kg.  Today he weighs 91 kg.

  Wife reports that he only drinks Ensure, and only does so when she forces him 

to.  He is denying suicidal or homicidal ideation, however is admitting to 

feeling depressed.  Patient is a fall risk.  He is also failing to thrive.  He 

will be admitted for further treatment and evaluation.  Case discussed with Dr. Fregoso's nurse practitioner Donna.  Because Dr Fregoso is on vacation, 

she would like the patient to be admitted to the [facility] service.  Case discussed 

with hospitalist Dr. Nuñez will admit the patient to her service.  Patient is 

also noted to be hypotensive with a blood pressure of 90s over 60s.  He is 

awake and alert.  This will also be addressed during his admission.





Diagnosis





 Primary Impression:  


 Hypotension


 Qualified Code:  I95.9 - Hypotension, unspecified hypotension type


 Additional Impressions:  


 Failure to thrive in adult


 Generalized weakness


 Frequent falls


 Depression


 Qualified Code:  F32.9 - Depression, unspecified depression type





Admitting Information


Admitting Physician Requests:  Admit








Cosme Lindo MD Jul 17, 2017 18:24

## 2017-07-18 VITALS
RESPIRATION RATE: 15 BRPM | TEMPERATURE: 97.9 F | HEART RATE: 76 BPM | DIASTOLIC BLOOD PRESSURE: 65 MMHG | SYSTOLIC BLOOD PRESSURE: 147 MMHG | OXYGEN SATURATION: 100 %

## 2017-07-18 VITALS
TEMPERATURE: 98 F | DIASTOLIC BLOOD PRESSURE: 73 MMHG | OXYGEN SATURATION: 100 % | SYSTOLIC BLOOD PRESSURE: 147 MMHG | HEART RATE: 92 BPM | RESPIRATION RATE: 15 BRPM

## 2017-07-18 VITALS
RESPIRATION RATE: 14 BRPM | TEMPERATURE: 97.6 F | DIASTOLIC BLOOD PRESSURE: 67 MMHG | OXYGEN SATURATION: 97 % | SYSTOLIC BLOOD PRESSURE: 133 MMHG | HEART RATE: 88 BPM

## 2017-07-18 VITALS
DIASTOLIC BLOOD PRESSURE: 59 MMHG | HEART RATE: 104 BPM | OXYGEN SATURATION: 97 % | RESPIRATION RATE: 17 BRPM | TEMPERATURE: 97.9 F | SYSTOLIC BLOOD PRESSURE: 104 MMHG

## 2017-07-18 VITALS — OXYGEN SATURATION: 99 % | HEART RATE: 106 BPM | RESPIRATION RATE: 22 BRPM | TEMPERATURE: 98 F

## 2017-07-18 LAB
ANION GAP SERPL CALC-SCNC: 5 MEQ/L (ref 5–15)
BASOPHILS # BLD AUTO: 0 TH/MM3 (ref 0–0.2)
BASOPHILS NFR BLD: 0.3 % (ref 0–2)
BUN SERPL-MCNC: 12 MG/DL (ref 7–18)
CHLORIDE SERPL-SCNC: 104 MEQ/L (ref 98–107)
EOSINOPHIL # BLD: 0 TH/MM3 (ref 0–0.4)
EOSINOPHIL NFR BLD: 0.5 % (ref 0–4)
ERYTHROCYTE [DISTWIDTH] IN BLOOD BY AUTOMATED COUNT: 12.9 % (ref 11.6–17.2)
GFR SERPLBLD BASED ON 1.73 SQ M-ARVRAT: 103 ML/MIN (ref 89–?)
HCO3 BLD-SCNC: 30.8 MEQ/L (ref 21–32)
HCT VFR BLD CALC: 42.4 % (ref 39–51)
HEMO FLAGS: (no result)
LYMPHOCYTES # BLD AUTO: 0.5 TH/MM3 (ref 1–4.8)
LYMPHOCYTES NFR BLD AUTO: 8.2 % (ref 9–44)
MCH RBC QN AUTO: 30.8 PG (ref 27–34)
MCHC RBC AUTO-ENTMCNC: 33.3 % (ref 32–36)
MCV RBC AUTO: 92.4 FL (ref 80–100)
MONOCYTES NFR BLD: 10 % (ref 0–8)
NEUTROPHILS # BLD AUTO: 4.8 TH/MM3 (ref 1.8–7.7)
NEUTROPHILS NFR BLD AUTO: 81 % (ref 16–70)
PLATELET # BLD: 172 TH/MM3 (ref 150–450)
POTASSIUM SERPL-SCNC: 3.8 MEQ/L (ref 3.5–5.1)
RBC # BLD AUTO: 4.59 MIL/MM3 (ref 4.5–5.9)
SODIUM SERPL-SCNC: 140 MEQ/L (ref 136–145)
WBC # BLD AUTO: 5.9 TH/MM3 (ref 4–11)

## 2017-07-18 RX ADMIN — DULOXETINE HYDROCHLORIDE SCH MG: 30 CAPSULE, DELAYED RELEASE ORAL at 07:57

## 2017-07-18 RX ADMIN — WATER SCH ML: 1 IRRIGANT IRRIGATION at 07:57

## 2017-07-18 RX ADMIN — DOCUSATE SODIUM SCH MG: 100 CAPSULE, LIQUID FILLED ORAL at 07:57

## 2017-07-18 RX ADMIN — Medication SCH TAB: at 07:57

## 2017-07-18 RX ADMIN — PHENYTOIN SODIUM SCH MLS/HR: 50 INJECTION INTRAMUSCULAR; INTRAVENOUS at 06:26

## 2017-07-18 NOTE — HHI.FF
Face to Face Verification


Diagnosis:  


(1) Frequent falls


(2) Failure to thrive in adult


Physical Therapy


Order:  Evaluate and Treat, Improve ambulation


Instructions:


5 days a week





Home Health Aide


Order: To Assist In:  Bathing and personal care








Order: To Evaluate:  Living conditions/environment, Support services








I have seen patient Neymar Newell on 7/18/17. My clinical findings 

support the need for the requested home health care services because:


Med compliance is questionable








I certify that my clinical findings support that this patient is homebound 

because:


Unsteady gait/balance








Bekah Nuñez MD Jul 18, 2017 12:40

## 2017-07-18 NOTE — HHI.DS
cc:   Edyta Patel MD


__________________________________________________





Discharge Summary


Admission Date


Jul 17, 2017 at 18:36


Discharge Date:  Jul 18, 2017


Admitting Diagnosis


hypotension, failure to thrive, falls, depression





(1) Hypotension


ICD Code:  I95.9


(2) Depression


ICD Code:  F32.9


(3) Abdominal pain


ICD Code:  R10.9


(4) Obstipation


ICD Code:  K59.00


Procedures


none


Brief History - From Admission


This patient is a 72-year-old gentleman with history of depression who has come 

to the hospital with 2 weeks of no bowel movements despite taking MiraLAX and 

suppositories at home.  He has been generally weak due to not eating.  He says 

his stomach feels full.  His pain is moderate and not improved with any 

measures.  His wife accompanies him and says he's lost at least 7 pounds in a 

week from not eating.  He has been nauseated but not vomiting.  Patient denies 

any fevers or chills.  He says that he has been sleeping more and his wife is 

concerned that he is unmotivated and has not been out of the house in 20 days.  

Normally the patient takes trazodone, zolpidem and Eloxatin as well as 

lorazepam.  His primary doctor for anxiety and depression.  He has been 

sleeping poorly..  Patient denies any suicidal or homicidal ideation.  The 

patient is severely dehydrated on exam with some tenting of his skin and dry 

mucous membranes.  He is hypotensive.  Patient will need to be admitted to the 

hospital for further evaluation of severe dehydration and malnourishment


CBC/BMP:  


7/18/17 0435                                                                   

             7/18/17 0435





Significant Findings





Laboratory Tests








Test 7/17/17 7/17/17 7/18/17





 16:27 17:49 04:35


 


Neutrophils (%) (Auto) 83.1 %  81.0 %





 (16.0-70.0)  (16.0-70.0)


 


Lymphocytes (%) (Auto) 7.1 %  8.2 %





 (9.0-44.0)  (9.0-44.0)


 


Monocytes (%) (Auto) 9.5 % (0.0-8.0)  10.0 %





   (0.0-8.0)


 


Lymphocytes # (Auto) 0.4 TH/MM3  0.5 TH/MM3





 (1.0-4.8)  (1.0-4.8)


 


Blood Urea Nitrogen 19 MG/DL (7-18)  


 


Estimat Glomerular Filtration 87 ML/MIN (>89)  





Rate   


 


Total Bilirubin 1.2 MG/DL  





 (0.2-1.0)  


 


Alanine Aminotransferase 93 U/L (12-78)  





(ALT/SGPT)   


 


Urine Color  DALE 





  (YELLW/STRAW) 


 


Urine Ketones  TRACE mg/dL 





  (NEG) 


 


Urine Mucus  MOD /lpf (OCC) 


 


Urine Sperm  OCC  (NONE) 








Imaging





Last Impressions








Abdomen/Pelvis CT 7/17/17 1607 Signed





Impressions: 





 Service Date/Time:  Monday, July 17, 2017 17:19 - CONCLUSION:  Normal 





 examination. Moderate stool throughout the colon.      Sheldon Flores MD 


 


Head CT 7/17/17 0000 Signed





Impressions: 





 Service Date/Time:  Monday, July 17, 2017 17:15 - CONCLUSION:  Normal 





 examination.       Sheldon Flroes MD 








PE at Discharge


GENERAL: This is a well-nourished, well-developed patient, flat affect


CARDIOVASCULAR: Regular rate and rhythm without murmurs, gallops, or rubs. 


RESPIRATORY: Clear to auscultation. Breath sounds equal bilaterally. No wheezes

, rales, or rhonchi.  


GASTROINTESTINAL: Abdomen soft, non-tender, nondistended. hypo active bowel 

sounds


MUSCULOSKELETAL: Extremities without clubbing, cyanosis, or edema.


NEURO:  Alert & Oriented x4 to person, place, time, situation.  Moves all ext x4


Pt update on day of discharge


Seen today in follow-up for depression and constipation.  Patient passing gas 

and abdominal discomfort is improved.  Patient more hydrated after IV 

hydration.  Patient with out further complaints.  He is seen by psychiatry and 

recommended for voluntary evaluation for which the patient refused


Hospital Course


This patient is 72-year-old gentleman was obstipation/constipation and with 

depression.  Patient required IV hydration and was monitored for improvement.  

Patient also required aggressive oral regimen for bowel improvement.  He was 

seen by psychiatrist for his major depressive disorder.  Patient was 

recommended for adjustment in medications and continued psychiatric care on a 

voluntary basis.


Pt Condition on Discharge:  Good


Discharge Disposition:  Discharge Home


Discharge Time:  > 30 minutes


Discharge Instructions


DIET: Follow Instructions for:  As Tolerated, No Restrictions


Activities you can perform:  Regular-No Restrictions


Follow up Referrals:  


PCP Follow-up - 1 Week


Psychiatry Adult - 1 Week





New Medications:  


Pramipexole (Pramipexole) 0.125 Mg Tab


0.125 MG PO DAILY Parkinson Disease Mgmt #30 Ref 0 TAB


Docusate Sodium (Dok) 100 Mg Cap


100 MG PO BID Constipation #62 CAP


Duloxetine DR (Duloxetine DR) 30 Mg Capdr


60 MG PO BID Depression Control #62 CAP


Lactobacillus Acidophilus (Acidophilus/l-Sporogenes) 1 Tab Tab


1 TAB PO Q12HR Constipation #62 TAB


 ([Lactulose Liq]) 30 ML SYRP


30 ML PO BID Constipation #62 ML


 


Continued Medications:  


Atorvastatin (Atorvastatin) 40 Mg Tab


40 MG PO HS Cholesterol Management #30 Ref 0 TAB


Lorazepam (Lorazepam) 1 Mg Tab


1 MG PO Q8H PRN ANXIETY Ref 0 TAB


Trazodone (Trazodone) 100 Mg Tablet


100 MG PO HS Control Depression #30 Ref 0 TAB


Zolpidem (Zolpidem) 10 Mg Tab


10 MG PO HS INSOMNIA Ref 0 TAB


 


Discontinued Medications:  


Duloxetine DR (Duloxetine DR) 30 Mg Capdr


30 MG PO BID #30 Ref 0 CAP











Bekah Nuñez MD Jul 18, 2017 12:37

## 2017-07-18 NOTE — RADRPT
EXAM DATE/TIME:  07/18/2017 13:54 

 

[facility] COMPARISON:     

No previous studies available for comparison.

 

                     

INDICATIONS :     

Constipation.

                     

 

MEDICAL HISTORY :     

None.          

 

SURGICAL HISTORY :     

None.   

 

ENCOUNTER:     

Subsequent                                        

 

ACUITY:     

2 weeks      

 

PAIN SCORE:     

2/10

 

LOCATION:       

Abdomen, all quadrants.

 

FINDINGS:     

Supine view of the abdomen was performed.  The abdominal bowel gas pattern is normal.  No abnormal ma
sses, calcifications, or organomegaly is seen.  The osseous structures are unremarkable.

 

CONCLUSION:     

Normal examination. Moderate stool throughout the colon.  

 

 

 

 Sheldon Flores MD on July 18, 2017 at 14:01           

Board Certified Radiologist.

 This report was verified electronically.

## 2017-07-18 NOTE — EKG
Date Performed: 07/17/2017       Time Performed: 16:17:42

 

PTAGE:      72 years

 

EKG:      Sinus rhythm 

 

 WITH FREQUENT VENTRICULAR PREMATURE COMPLEXES ABNORMAL RHYTHM ECG

 

PREVIOUS TRACING       : 06/12/2017 10.50 Compared to the previous tracing, PVCs are now noted

 

DOCTOR:   David Bonilla  Interpretating Date/Time  07/18/2017 19:00:46

## 2017-07-18 NOTE — HHI.DCPOC
Discharge Care Plan


Diagnosis:  


(1) Obstipation


(2) Major depressive disorder, recurrent, moderate


Goals to Promote Your Health


* To prevent worsening of your condition and complications


* To maintain your health at the optimal level


Directions to Meet Your Goals


*** Take your medications as prescribed


*** Follow your dietary instruction


*** Follow activity as directed








*** Keep your appointments as scheduled


*** Take your immunizations and boosters as scheduled


*** If your symptoms worsen call your PCP, if no PCP go to Urgent Care Center 

or Emergency Room***


*** Smoking is Dangerous to Your Health. Avoid second hand smoke***


***Call the 24-hour hour crisis hotline for domestic abuse at 1-632.981.7245***








Bekah Nuñez MD Jul 18, 2017 12:25

## 2017-07-18 NOTE — PD.PSY.CON
Provisional Diagnosis


Admission Date


Jul 17, 2017 at 18:36


Axis I.


Major depressive disorder, recurrent, moderate, without psychosis


Axis II.


Deferred


Axis III.


Restless leg syndrome, peripheral neuropathy


Axis IV.


Isolation, medical stressors


Axis V.


55





History of Present Illness


Service


Psychiatry


Consult Requested By





Primary Care Physician


Unknown


HPI


The patient is a 72-year-old  man, domicile with his wife in [place], retired, with psychiatric history of depression, insomnia, no previous 

psychiatric hospitalizations, no previous suicidal attempts, he is on Cymbalta 

30 mg, Ambien 5 mg, Trazodone 100 mg prescribed by PCP, with [place] and medical 

history of restless leg syndrome, peripheral neuropathy, as per H&P note who 

has come to the hospital with 2 weeks of no bowel movements despite taking 

MiraLAX and suppositories at home.  He has been generally weak due to not 

eating.  He says his stomach feels full.  His pain is moderate and not improved 

with any measures.  His wife accompanies him and says he's lost at least 7 

pounds in a week from not eating.  He has been nauseated but not vomiting.  

Patient denies any fevers or chills.  He says that he has been sleeping more 

and his wife is concerned that he is unmotivated and has not been out of the 

house in 20 days.  On psychiatric evaluation today patient is found calm, 

superficially cooperative, distant.  With flat affect, sometimes irritable.  

Patient said that he has been feeling depressed in the last months, he points 

at stressors his underlying medical conditions "pain in my legs or the time, 

and inability to keep my legs quite".  He also reports "some problems with my 

wife and isolation from a friend and the society".  For this reason patient has 

been feeling down, sad, depressed, with decreased energy, insomnia, very poor 

appetite, weight loss, and poor consideration capacity.  Patient has been no 

enjoying life, no interacting with his friend in the way he used to mostly at 

home doing nothing.  Patient reports no suicidal ideation, he actually is 

future oriented and can identified several protective factors his life, such as 

the love his wife, his daughter, and Muslim and ethical values.  He reports 

taking his psychotropics, Cymbalta, Ambien and trazodone, he also reports 

taking lorazepam 2 mg at bedtime for restless leg syndrome.  Patient denies 

anhedonia, he denies hopelessness, he denies helplessness, he denies 

worthlessness.  Patient also denies anxiety, he says that his mostly in peace, 

but with some intrusive thoughts.  Patient is oriented 3, without any 

attention deficit, no fluctuation of consciousness, no gross cognitive 

impairment present.  Patient denies the use of alcohol and illicit drugs.





Review of Systems


Constitutional:  COMPLAINS OF: Fatigue, Weight loss, Change in appetite,  DENIES

: Diaphoretic episodes, Fever, Weight gain, Chills, Dizziness, Night Sweats


Endocrine:  DENIES: Heat/cold intolerance, Polydipsia, Polyuria, Polyphagia


Eyes:  DENIES: Blurred vision, Diplopia, Eye inflammation, Eye pain, Vision loss

, Photosensitivity, Double Vision


Ears, nose, mouth, throat:  DENIES: Tinnitus, Hearing loss, Vertigo, Nasal 

discharge, Oral lesions, Throat pain, Hoarseness, Ear Pain, Running Nose, 

Epistaxis, Sinus Pain, Toothache, Odynophagia


Respiratory:  DENIES: Apneas, Cough, Snoring, Wheezing, Hemoptysis, Sputum 

production, Shortness of breath


Cardiovascular:  DENIES: Chest pain, Palpitations, Syncope, Dyspnea on Exertion

, PND, Lower Extremity Edema, Orthopnea, Claudication


Gastrointestinal:  DENIES: Abdominal pain, Black stools, Bloody stools, 

Constipation, Diarrhea, Nausea, Vomiting, Difficulty Swallowing, Anorexia


Musculoskeletal:  COMPLAINS OF: Joint pain,  DENIES: Muscle aches, Stiffness, 

Joint Swelling, Back pain, Neck pain


Integumentary:  DENIES: Abnormal pigmentation, Nail changes, Pruritus, Rash


Hematologic/lymphatic:  DENIES: Bruising, Lymphadenopathy


Immunologic/allergic:  DENIES: Eczema, Urticaria


Neurologic:  DENIES: Abnormal gait, Headache, Localized weakness, Paresthesias, 

Seizures, Speech Problems, Tremor, Poor Balance


Psychiatric:  COMPLAINS OF: Anxiety, Depression,  DENIES: Confusion, Mood 

changes, Hallucinations, Agitation, Suicidal Ideation, Homicidal Ideation, 

Delusions


Other


Patient reports lower leg pains.





Past Family Social History


Coded Allergies:  


     Sulfa (Verified  Allergy, Intermediate, vomiting, 7/17/17)


Reported Medications


Duloxetine DR 30 Mg Capdr30 Mg PO BID  #30 CAP  Ref 0


   6/12/17


Atorvastatin 40 Mg Tab40 Mg PO HS  #30 TAB  Ref 0


   6/12/17


Lorazepam 1 Mg Tab1 Mg PO Q8H PRN (ANXIETY)  Ref 0


   6/12/17


Zolpidem 10 Mg Tab10 Mg PO HS   Ref 0


   6/12/17


Trazodone 100 Mg [id number] Mg PO HS  #30 TAB  Ref 0


   6/12/17


Discontinued Reported Medications


Montelukast 10 Mg Tab10 Mg PO HS  #30 TAB  Ref 0


   6/12/17





 Current Medications








 Medications


  (Trade)  Dose


 Ordered  Sig/Sophia


 Route  Start Time


 Stop Time Status Last Admin


 


 Sodium Chloride 2


 ml  2 ml  UNSCH  PRN


 IV FLUSH  7/17/17 16:15


     


 


 


  (NS 1000 ml Inj)  1,000 ml @ 


 100 mls/hr  Q10H


 IV  7/17/17 18:41


    7/18/17 06:26


 


 


  (Tylenol)  650 mg  Q4H  PRN


 PO  7/17/17 18:45


     


 


 


  (Ambien)  5 mg  HS  PRN


 PO  7/17/17 18:45


     


 


 


  (Lactulose Liq)  30 ml  BID


 PO  7/17/17 21:00


    7/18/17 07:57


 


 


  (Lactinex)  1 tab  Q12HR


 PO  7/17/17 21:00


    7/18/17 07:57


 


 


  (Colace)  100 mg  BID


 PO  7/17/17 21:00


    7/18/17 07:57


 


 


  (Cymbalta Dr)  30 mg  BID


 PO  7/17/17 21:00


    7/18/17 07:57


 


 


  (Desyrel)  100 mg  HS


 PO  7/17/17 21:00


    7/17/17 22:42


 








Family History


Patient denies family psychiatric history


Social History


Patient was born and raised in [place], he lives in [place] since 2008 

with his wife, he also lives with his daughter, is a retired , 

his highest level of education is a master degree


Patient's Strengths (min. 2)


Family support, high level of education





Physical Exam


On physical exam the patient does not present any EPS, withdrawal symptoms, 

tremors, extremity weakness, but he does percent son hypoactivity and 

psychomotor retardation.


Vital Signs





 Vital Signs








  Date Time  Temp Pulse Resp B/P Pulse Ox O2 Delivery O2 Flow Rate FiO2


 


7/18/17 04:00 98.0 92 15 147/73 100   


 


7/17/17 21:36      Room Air  








 I/O








 7/17/17 7/17/17 7/17/17





 07:59 15:59 23:59


 


Intake Total   2100 ml


 


Output Total   100 ml


 


Balance   2000 ml








Lab Results


Laboratory Tests








Test 7/17/17 7/17/17





 16:27 17:49


 


White Blood Count 5.9  


 


Red Blood Count 5.11  


 


Hemoglobin 15.3  


 


Hematocrit 47.0  


 


Mean Corpuscular Volume 92.0  


 


Mean Corpuscular Hemoglobin 29.9  


 


Mean Corpuscular Hemoglobin 32.5  





Concent  


 


Red Cell Distribution Width 12.9  


 


Platelet Count 209  


 


Mean Platelet Volume 8.5  


 


Neutrophils (%) (Auto) 83.1  


 


Lymphocytes (%) (Auto) 7.1  


 


Monocytes (%) (Auto) 9.5  


 


Eosinophils (%) (Auto) 0.1  


 


Basophils (%) (Auto) 0.2  


 


Neutrophils # (Auto) 4.9  


 


Lymphocytes # (Auto) 0.4  


 


Monocytes # (Auto) 0.6  


 


Eosinophils # (Auto) 0.0  


 


Basophils # (Auto) 0.0  


 


CBC Comment AUTO DIFF  


 


Differential Comment AUTO DIFF 





 CONFIRMED 


 


Prothrombin Time 10.7  


 


Prothromb Time International 1.0  





Ratio  


 


Activated Partial 24.6  





Thromboplast Time  


 


Sodium Level 138  


 


Potassium Level 4.1  


 


Chloride Level 104  


 


Carbon Dioxide Level 26.1  


 


Anion Gap 8  


 


Blood Urea Nitrogen 19  


 


Creatinine 0.87  


 


Estimat Glomerular Filtration 87  





Rate  


 


Random Glucose 92  


 


Calcium Level 9.4  


 


Total Bilirubin 1.2  


 


Aspartate Amino Transf 37  





(AST/SGOT)  


 


Alanine Aminotransferase 93  





(ALT/SGPT)  


 


Alkaline Phosphatase 71  


 


Total Protein 7.0  


 


Albumin 3.8  


 


Lipase 138  


 


Urine pH  6.0 


 


Urine Protein  NEG 


 


Urine Glucose (UA)  NEG 


 


Urine Ketones  TRACE 


 


Urine Occult Blood  NEG 


 


Urine Nitrite  NEG 


 


Urine Bilirubin  NEG 


 


Urine Leukocyte Esterase  NEG 








Result Diagram:  


7/17/17 1627                                                                   

             7/17/17 1627











Mental Status Examination


Appearance


 elderly man, who appears younger than his stated age, good muscular 

shape, good hygiene, [facility], calm, superficially cooperative, distant


Speech:  Hesitant, Slow


Orientation:  x3


Memory:  Unremarkable


Thought Process:  Logical, Goal Directed, Linear


Thought Content:  Unremarkable


Language


Fluent and spontaneous


Fund of Knowledge


Adequate for his level of education


Hallucination Type:  Auditory


Attention and Concentration:  Good


Suicidal Ideation:  No


Previous Suicide Attempts:  No


Homicidal Ideation:  No


Previous Homicide Attempts:  No


Judgment:  WNL


Affect:  Other (restricted)


Affect if Inappropriate:  Blunt


Mood:  Sad


Motor Activity:  Normal gait





Assessment & Plan


Problem List:  


(1) Major depressive disorder, recurrent, moderate


Assessment & Plan:  The patient is a 72-year-old  man, domicile with 

his wife in [place], retired, with psychiatric history of depression, 

insomnia, no previous psychiatric hospitalizations, no previous suicidal 

attempts, he is on Cymbalta 30 mg, Ambien 5 mg, Trazodone 100 mg prescribed by 

PCP, with significant medical history of restless leg syndrome, peripheral 

neuropathy,   admitted due to hypotension and failure to thrive, consulted to 

psychiatry due to depression.  On psychiatric evaluation today the patient 

presents distant, visibly depressed, he reports moderate to severe 

neurovegetative symptoms of depression, such as insomnia, low appetite, weight 

loss, low level of energy.  He denies suicidal or homicidal ideation, he denies 

visual and auditory hallucinations.  She is oriented 3, not attention deficit, 

no gross cognitive impairment present.  Current presentation seems to be 

related with ongoing major depressive disorder, he has been treated in 

outpatient by PCP.  We will increase Cymbalta to 60 mg to help with depression, 

will increase Ambien to 10 mg to help with insomnia.  Continue trazodone 100 

mg.  For restless leg syndrome with recommend a dopaminergic medication, rather 

than a benzodiazepine. Can use pramipexole 0.125 or Ropinirole 0.25 once daily.

  Patient might benefit of a voluntary psychiatric admission to treat his 

depression, but he is not [place] actable.  But, he can also choose to continue 

his psychiatric treatment as an outpatient.  Extensive supportive psychotherapy 

and psychoeducation provided.  Consult appreciated.


ICD Code:  F33.1


Assessment & Plan


Estimated LOS:  Abdulkadir Ríos MD Jul 18, 2017 11:26

## 2017-07-31 ENCOUNTER — HOSPITAL ENCOUNTER (INPATIENT)
Dept: HOSPITAL 17 - NEPC | Age: 72
LOS: 8 days | Discharge: HOME | DRG: 885 | End: 2017-08-08
Attending: PSYCHIATRY & NEUROLOGY | Admitting: PSYCHIATRY & NEUROLOGY
Payer: COMMERCIAL

## 2017-07-31 VITALS
OXYGEN SATURATION: 98 % | HEART RATE: 83 BPM | SYSTOLIC BLOOD PRESSURE: 103 MMHG | RESPIRATION RATE: 14 BRPM | DIASTOLIC BLOOD PRESSURE: 55 MMHG

## 2017-07-31 VITALS — BODY MASS INDEX: 24.29 KG/M2 | HEIGHT: 75 IN | WEIGHT: 195.33 LBS

## 2017-07-31 VITALS
HEART RATE: 102 BPM | SYSTOLIC BLOOD PRESSURE: 124 MMHG | RESPIRATION RATE: 14 BRPM | DIASTOLIC BLOOD PRESSURE: 66 MMHG | OXYGEN SATURATION: 98 % | TEMPERATURE: 97.8 F

## 2017-07-31 VITALS
OXYGEN SATURATION: 98 % | DIASTOLIC BLOOD PRESSURE: 65 MMHG | HEART RATE: 88 BPM | RESPIRATION RATE: 14 BRPM | SYSTOLIC BLOOD PRESSURE: 99 MMHG

## 2017-07-31 DIAGNOSIS — G62.9: ICD-10-CM

## 2017-07-31 DIAGNOSIS — K59.01: ICD-10-CM

## 2017-07-31 DIAGNOSIS — Z88.2: ICD-10-CM

## 2017-07-31 DIAGNOSIS — E87.1: ICD-10-CM

## 2017-07-31 DIAGNOSIS — E78.5: ICD-10-CM

## 2017-07-31 DIAGNOSIS — R00.0: ICD-10-CM

## 2017-07-31 DIAGNOSIS — R79.89: ICD-10-CM

## 2017-07-31 DIAGNOSIS — Z85.820: ICD-10-CM

## 2017-07-31 DIAGNOSIS — G47.00: ICD-10-CM

## 2017-07-31 DIAGNOSIS — F33.1: Primary | ICD-10-CM

## 2017-07-31 LAB
ALP SERPL-CCNC: 60 U/L (ref 45–117)
ALT SERPL-CCNC: 47 U/L (ref 12–78)
ANION GAP SERPL CALC-SCNC: 10 MEQ/L (ref 5–15)
AST SERPL-CCNC: 23 U/L (ref 15–37)
BASOPHILS # BLD AUTO: 0 TH/MM3 (ref 0–0.2)
BASOPHILS NFR BLD: 0.2 % (ref 0–2)
BILIRUB SERPL-MCNC: 1.4 MG/DL (ref 0.2–1)
BUN SERPL-MCNC: 17 MG/DL (ref 7–18)
CHLORIDE SERPL-SCNC: 101 MEQ/L (ref 98–107)
COLOR UR: YELLOW
COMMENT (UR): (no result)
CULTURE IF INDICATED: (no result)
EOSINOPHIL # BLD: 0 TH/MM3 (ref 0–0.4)
EOSINOPHIL NFR BLD: 0 % (ref 0–4)
ERYTHROCYTE [DISTWIDTH] IN BLOOD BY AUTOMATED COUNT: 14 % (ref 11.6–17.2)
GFR SERPLBLD BASED ON 1.73 SQ M-ARVRAT: 77 ML/MIN (ref 89–?)
GLUCOSE UR STRIP-MCNC: (no result) MG/DL
HCO3 BLD-SCNC: 28 MEQ/L (ref 21–32)
HCT VFR BLD CALC: 44.6 % (ref 39–51)
HEMO FLAGS: (no result)
HGB UR QL STRIP: (no result)
KETONES UR STRIP-MCNC: (no result) MG/DL
LYMPHOCYTES # BLD AUTO: 0.4 TH/MM3 (ref 1–4.8)
LYMPHOCYTES NFR BLD AUTO: 8 % (ref 9–44)
MCH RBC QN AUTO: 31.9 PG (ref 27–34)
MCHC RBC AUTO-ENTMCNC: 35 % (ref 32–36)
MCV RBC AUTO: 91 FL (ref 80–100)
MONOCYTES NFR BLD: 9.6 % (ref 0–8)
NEUTROPHILS # BLD AUTO: 4.6 TH/MM3 (ref 1.8–7.7)
NEUTROPHILS NFR BLD AUTO: 82.2 % (ref 16–70)
NITRITE UR QL STRIP: (no result)
PLATELET # BLD: 162 TH/MM3 (ref 150–450)
POTASSIUM SERPL-SCNC: 3.9 MEQ/L (ref 3.5–5.1)
RBC # BLD AUTO: 4.9 MIL/MM3 (ref 4.5–5.9)
SODIUM SERPL-SCNC: 139 MEQ/L (ref 136–145)
SP GR UR STRIP: 1.03 (ref 1–1.03)
WBC # BLD AUTO: 5.6 TH/MM3 (ref 4–11)

## 2017-07-31 PROCEDURE — 84443 ASSAY THYROID STIM HORMONE: CPT

## 2017-07-31 PROCEDURE — 74020: CPT

## 2017-07-31 PROCEDURE — 85025 COMPLETE CBC W/AUTO DIFF WBC: CPT

## 2017-07-31 PROCEDURE — 81001 URINALYSIS AUTO W/SCOPE: CPT

## 2017-07-31 PROCEDURE — 80061 LIPID PANEL: CPT

## 2017-07-31 PROCEDURE — 80178 ASSAY OF LITHIUM: CPT

## 2017-07-31 PROCEDURE — 93005 ELECTROCARDIOGRAM TRACING: CPT

## 2017-07-31 PROCEDURE — 80048 BASIC METABOLIC PNL TOTAL CA: CPT

## 2017-07-31 PROCEDURE — 83036 HEMOGLOBIN GLYCOSYLATED A1C: CPT

## 2017-07-31 PROCEDURE — 80307 DRUG TEST PRSMV CHEM ANLYZR: CPT

## 2017-07-31 PROCEDURE — 82247 BILIRUBIN TOTAL: CPT

## 2017-07-31 PROCEDURE — 82248 BILIRUBIN DIRECT: CPT

## 2017-07-31 PROCEDURE — 80053 COMPREHEN METABOLIC PANEL: CPT

## 2017-07-31 NOTE — PD
HPI


Chief Complaint:  Psychiatric Symptoms


Time Seen by Provider:  18:25


Travel History


International Travel<30 days:  No


Contact w/Intl Traveler<30days:  No


Traveled to known affect area:  No





History of Present Illness


HPI


72- year old male presents complaining of severe depression and bloating. The 

patient states that the depression started in the beginning of May. The patient 

was originally on Cymbalta and was supposed to be transitioned to Elavil. The 

patient reports that the Elavil was not covered by his insurance and currently 

is not on either medication. The patient reports he has no appetite and is not 

sleeping well. He states he is not able to sleep without taking Ambien or 

Trazodone. He denies any SI/HI. The patient reports that his last bowel 

movement was one week ago, and normally has a BM daily. He denies any abdominal 

pain, but states that he feels bloated. He denies any nausea, vomiting, fevers, 

or chills. He denies any prior abdominal surgeries. The patient denies any 

pain. The patient denies any other medical issues besides peripheral neuropathy 

in his feet.





PFSH


Past Medical History


Anxiety:  Yes


Depression:  Yes


Cancer:  Yes (lipoma removed from left leg)


Cardiovascular Problems:  Yes (IDIOPATHIC NEUROPATHY BILAT LEGS)


High Cholesterol:  Yes


Diminished Hearing:  No


Musculoskeletal:  Yes (RLS)


Influenza Vaccination:  Yes





Past Surgical History


Other Surgery:  Yes (lipoma removed)





Social History


Alcohol Use:  No


Tobacco Use:  No


Substance Use:  No





Allergies-Medications


(Allergen,Severity, Reaction):  


Coded Allergies:  


     Sulfa (Verified  Allergy, Intermediate, vomiting, 7/31/17)


Reported Meds & Prescriptions





Reported Meds & Active Scripts


Active


Dok (Docusate Sodium) 100 Mg Cap 100 Mg PO BID


Reported


Lorazepam 1 Mg Tab 1 Mg PO Q8H PRN


Zolpidem (Zolpidem Tartrate) 10 Mg Tab 10 Mg PO HS


Trazodone (Trazodone HCl) 100 Mg Tablet 100 Mg PO HS








Review of Systems


General / Constitutional:  No: Fever, Chills, Weight Gain, Weight Loss, Other


Eyes:  No: Diploplia, Blurred Vision, Photophobia, Drainage, Redness, Foreign 

Body Sensation, Pain, Tearing, Blind Spots, Visual changes, Blindness, Other


HENT:  No: Headaches, Vertigo, Lightheadedness, Sore Throat, Rhinitis, 

Rhinorrhea, Congestion, Nosebleed, Neck Stiffness, Neck Pain, Masses, Gingival 

Bleeding, Dental Difficulties, Ear Discharge, Earache, Other


Cardiovascular:  No: Chest Pain or Discomfort, Palpitations, Irregular Rhythm, 

Tachycardia, Diaphoresis, Syncope, Dyspnea on exertion, Varicosities, Edema, 

Cyanosis, Varicosities, Phlebitis, Claudication, Other


Respiratory:  No: Cough, Shortness of Breath, Wheezing, Sneezing, Orthopnea, 

Hemoptysis, Stridor, Night Sweats, Pleuritic Pain, Other


Gastrointestinal:  Positive: Constipation, Other (Bloating),  No: Nausea, 

Vomiting, Diarrhea, Abdominal Pain, Hematemesis, Hematochezia, Changes in Bowel 

Habits, Indigestion, Dysphagia, Loss of Appetite


Genitourinary:  No: Urgency, Frequency, Dysuria, Nocturia, Hematuria, Decreased 

Urinary Output, Oliguria, Hesitancy, Dribbling, Incontinence, Pelvic Pain, 

Flank Pain, Dyspareunia, Discharge, Dysmenorrhea, Menorrhagia, Metorrhagia, 

Vaginal Bleeding, Other


Musculoskeletal:  No: Myalgias, Arthralgias, Limited ROM, Weakness, Cramping, 

Edema, Pain, Atrophy, Other


Skin:  No Rash, No Itching, No Dryness, No Lumps, No Hives, No Change in 

Pigmentation, No Change in nails, No Alopecia, No Lesions, No Breast Lumps, No 

Breast Tenderness, No Breast Swelling, No Other


Neurologic:  Positive: Paresthesia,  No: Weakness, Dizziness, Syncope, Focal 

Abnormalities, Coordination Problem, Tremor, Ataxia, Headache, Change in 

Mentation, Slurred Speech, Incontinence, Seizures, Sensory Disturbance, Other


Psychiatric:  Positive: Depression,  No: Anxiety, Suicidal Ideations, Disorder 

of Thought, Mood Disorder, Substance Abuse, Homicidal Ideation, Other





Physical Exam


Narrative


GENERAL: 


SKIN: Warm and dry.


HEAD: Atraumatic. Normocephalic. 


EYES: Pupils equal and round. No scleral icterus. No injection or drainage. 


ENT: No nasal bleeding or discharge.  Mucous membranes pink and moist.


NECK: Trachea midline. No JVD. 


CARDIOVASCULAR: Regular rate and rhythm.  


RESPIRATORY: No accessory muscle use. Clear to auscultation. Breath sounds 

equal bilaterally. 


GASTROINTESTINAL: Mildly distended, Abdomen soft, non-tender. Hepatic and 

splenic margins not palpable. 


MUSCULOSKELETAL: Extremities without clubbing, cyanosis, or edema. No obvious 

deformities. 


NEUROLOGICAL: Awake and alert. No obvious cranial nerve deficits.  Motor 

grossly within normal limits. Five out of 5 muscle strength in the arms and 

legs.  Normal speech.


PSYCHIATRIC: Depressed, insight and judgment normal.





Data


Data


Last Documented VS





Vital Signs








  Date Time  Temp Pulse Resp B/P Pulse Ox O2 Delivery O2 Flow Rate FiO2


 


7/31/17 19:23  83 14     


 


7/31/17 19:19    103/55 98 Room Air  


 


7/31/17 17:22 97.8       








Orders





 Complete Blood Count With Diff (7/31/17 18:18)


Comprehensive Metabolic Panel (7/31/17 18:18)


Thyroid Stimulating Hormone (7/31/17 18:18)


Urinalysis - C+S If Indicated (7/31/17 18:18)


Psych Screen (7/31/17 18:18)


Alcohol (Ethanol) (7/31/17 18:18)


Abdomen, Flat & Upright (7/31/17 )


Sodium Chlor 0.9% 1000 Ml Inj (Ns 1000 M (7/31/17 18:25)





Labs








 Laboratory Tests








Test 7/31/17 7/31/17





 18:20 19:16


 


White Blood Count 5.6 TH/MM3 


 


Red Blood Count 4.90 MIL/MM3 


 


Hemoglobin 15.6 GM/DL 


 


Hematocrit 44.6 % 


 


Mean Corpuscular Volume 91.0 FL 


 


Mean Corpuscular Hemoglobin 31.9 PG 


 


Mean Corpuscular Hemoglobin 35.0 % 





Concent  


 


Red Cell Distribution Width 14.0 % 


 


Platelet Count 162 TH/MM3 


 


Mean Platelet Volume 8.5 FL 


 


Neutrophils (%) (Auto) 82.2 % 


 


Lymphocytes (%) (Auto) 8.0 % 


 


Monocytes (%) (Auto) 9.6 % 


 


Eosinophils (%) (Auto) 0.0 % 


 


Basophils (%) (Auto) 0.2 % 


 


Neutrophils # (Auto) 4.6 TH/MM3 


 


Lymphocytes # (Auto) 0.4 TH/MM3 


 


Monocytes # (Auto) 0.5 TH/MM3 


 


Eosinophils # (Auto) 0.0 TH/MM3 


 


Basophils # (Auto) 0.0 TH/MM3 


 


CBC Comment DIFF FINAL  


 


Differential Comment   


 


Sodium Level 139 MEQ/L 


 


Potassium Level 3.9 MEQ/L 


 


Chloride Level 101 MEQ/L 


 


Carbon Dioxide Level 28.0 MEQ/L 


 


Anion Gap 10 MEQ/L 


 


Blood Urea Nitrogen 17 MG/DL 


 


Creatinine 0.96 MG/DL 


 


Estimat Glomerular Filtration 77 ML/MIN 





Rate  


 


Random Glucose 91 MG/DL 


 


Calcium Level 9.7 MG/DL 


 


Total Bilirubin 1.4 MG/DL 


 


Aspartate Amino Transf 23 U/L 





(AST/SGOT)  


 


Alanine Aminotransferase 47 U/L 





(ALT/SGPT)  


 


Alkaline Phosphatase 60 U/L 


 


Total Protein 6.6 GM/DL 


 


Albumin 3.8 GM/DL 


 


Thyroid Stimulating Hormone 2.480 uIU/ML 





3rd Gen  


 


Ethyl Alcohol Level LESS THAN 3 





 MG/DL 


 


Urine Color  YELLOW 


 


Urine Turbidity  CLEAR 


 


Urine pH  6.0 


 


Urine Specific Gravity  1.031 


 


Urine Protein  TRACE mg/dL


 


Urine Glucose (UA)  NEG mg/dL


 


Urine Ketones  TRACE mg/dL


 


Urine Occult Blood  NEG 


 


Urine Nitrite  NEG 


 


Urine Bilirubin  NEG 


 


Urine Urobilinogen  4.0 MG/DL


 


Urine Leukocyte Esterase  NEG 


 


Urine RBC  1 /hpf


 


Urine WBC  LESS THAN 1





  /hpf


 


Microscopic Urinalysis Comment  CULT NOT





  INDICATED














MDM


Medical Decision Making


Medical Screen Exam Complete:  Yes


Emergency Medical Condition:  Yes


Medical Record Reviewed:  Yes


Interpretation(s)


CBC & BMP Diagram


7/31/17 18:20








LFTs WNL


Differential Diagnosis


Depression vs. Anxiety vs. Failure to Thrive


Obstruction


Constipation


Narrative Course


72-year-old male that presents to the ED for evaluation of depression.  Patient 

was told by his doctor Dr. Patel to come here to get evaluated for 

depression.  Patient states that he has not had any medications for the past 

couple months.  Per patient he was hoping to get a new medication but his 

insurance weren't M so he has not been taking anything for this and his been 

feeling more anhedonic as well as depressed.  No suicidal ideation or homicidal 

ideation.  Patient also complaining of some constipation and losing weight.  

Patient was recently admitted for something similar.  He was more about where 

for the failure to thrive.  On examination patient does appear to be somewhat 

dehydrated but otherwise in no acute distress.  Labs and imaging were done and 

show constipation otherwise no sign of acute disease.  At this time patient 

will be medically clear for psychiatric evaluation.  Okay to be seen by psych.  

Case was discussed in my attending who agrees with this plan.


Mental health screening was discussed with the patient.





Diagnosis





 Primary Impression:  


 Major depressive disorder, recurrent, moderate


 Additional Impression:  


 Constipation


 Qualified Code:  K59.01 - Slow transit constipation








Fidel Ahmadi Jul 31, 2017 18:39

## 2017-07-31 NOTE — RADRPT
EXAM DATE/TIME:  07/31/2017 18:42 

 

[facility] COMPARISON:     

No previous studies available for comparison.

 

                     

INDICATIONS :     

Depression; patient unable to eat solid foods for 1 month. Patient states that he feels "Full."

                     

 

MEDICAL HISTORY :     

None.          

 

SURGICAL HISTORY :     

None.   

 

ENCOUNTER:     

Initial                                        

 

ACUITY:     

1 month      

 

PAIN SCORE:     

0/10

 

LOCATION:       

Abdomen.

 

FINDINGS:     

There are few gas containing loops of small bowel in the left hypogastric region which measure up to 
2.8 cm in width.  Gas in the right colon and prominent amount of stool in the transverse and left col
on.  No air-fluid levels seen on the erect view.  The visualized lower lungs are clear.  Moderate deg
enerative changes of lower lumbar spine with mild right lumbar scoliosis.  Calcified phleboliths in t
he lower pelvis.

 

CONCLUSION:     

Mild left-sided constipation and a few mildly dilated loops of small bowel in the left epigastric reg
ion.

 

 

 

 Thor Franco MD on July 31, 2017 at 19:19           

Board Certified Radiologist.

 This report was verified electronically.

## 2017-08-01 VITALS
SYSTOLIC BLOOD PRESSURE: 149 MMHG | OXYGEN SATURATION: 97 % | DIASTOLIC BLOOD PRESSURE: 78 MMHG | RESPIRATION RATE: 19 BRPM | HEART RATE: 71 BPM

## 2017-08-01 VITALS
OXYGEN SATURATION: 96 % | RESPIRATION RATE: 18 BRPM | SYSTOLIC BLOOD PRESSURE: 143 MMHG | HEART RATE: 79 BPM | DIASTOLIC BLOOD PRESSURE: 76 MMHG

## 2017-08-01 VITALS
OXYGEN SATURATION: 98 % | DIASTOLIC BLOOD PRESSURE: 63 MMHG | SYSTOLIC BLOOD PRESSURE: 109 MMHG | HEART RATE: 128 BPM | TEMPERATURE: 98.2 F | RESPIRATION RATE: 24 BRPM

## 2017-08-01 VITALS
DIASTOLIC BLOOD PRESSURE: 74 MMHG | RESPIRATION RATE: 17 BRPM | SYSTOLIC BLOOD PRESSURE: 128 MMHG | OXYGEN SATURATION: 98 % | HEART RATE: 79 BPM

## 2017-08-01 VITALS — RESPIRATION RATE: 18 BRPM | HEART RATE: 78 BPM | DIASTOLIC BLOOD PRESSURE: 75 MMHG | SYSTOLIC BLOOD PRESSURE: 133 MMHG

## 2017-08-01 VITALS
OXYGEN SATURATION: 94 % | RESPIRATION RATE: 18 BRPM | SYSTOLIC BLOOD PRESSURE: 138 MMHG | HEART RATE: 97 BPM | TEMPERATURE: 98 F | DIASTOLIC BLOOD PRESSURE: 78 MMHG

## 2017-08-01 RX ADMIN — DOCUSATE SODIUM SCH MG: 100 CAPSULE, LIQUID FILLED ORAL at 20:56

## 2017-08-01 NOTE — PD
__________________________________________________





History of Present Illness


Chief Complaint:  Psychiatric Symptoms


Time Seen by Provider:  09:45


Travel History


International Travel<30 Days:  No


Contact w/Intl Traveler<30days:  No


Known affected area:  No





Legal Status


Legal Status:  Voluntary


History of Present Illness:


History of Present Illness


HPI


72- year old male with history of restless leg syndrome, neuropathy as well as 

depression who  presents to the ED  for psychiatric evaluation as well as for 

evaluation of stomach bloating . He  states that he was seen by his PCP and she 

recommended that he come to the ED for further evaluation. NOELLE dent reports that in 

May his PCP  began treating his symptoms of depression. He was  started on 

Cymbalta. He saw his urologist last week who felt the Cymbalta  was not working 

so he  discontinued the Cymbalta and was prescribed Elavil. Nevertheless,the 

patient never started the Elavil since his insurance does not  cover it. The 

patient is complaining of not eating with reported  weight loss of at  fifty 

pounds since May, impaired sleep despite taking Ativan, Ambien and trazodone 

with only getting 5 hours per night, low level of  energy, anhedonia, social 

isolation, decreased concentration,and attention. Denies  suicidal ideation. 





EMR is reviewed. The patient was evaluated by Dr. Mckinnon on July 18, 2017 

while he was medically admitted. At that time his  medications were adjusted 

and voluntary admission was recommended but patient declined. 


He has  had no further contact with [facility] psychiatric department. 





Patient is seen in J pod. He is alert, oriented male in [facility]. He 

appears depressed  with flat affect. Speech is clear but paucity of response. 

There is no psychosis. He denies any suicidal or homicidal ideation, intent or 

plan. He agrees to a voluntary psychiatric admission.





PFSH


Past Medical History


Anxiety:  Yes


Depression:  Yes


Cancer:  Yes (lipoma removed from left leg)


Cardiovascular Problems:  Yes (IDIOPATHIC NEUROPATHY BILAT LEGS)


High Cholesterol:  Yes


Diminished Hearing:  No


Musculoskeletal:  Yes (RLS)


Influenza Vaccination:  Yes





Past Surgical History


Other Surgery:  Yes (lipoma removed)





Psychiatric History


Psychiatric History


Hx Psychiatric Treatment:  


None reported. Has been on antidepressants prescribed by his PCP since


may.


History of Inpatient Treatment:  No


Guns or firearms in home:  No





Social History


 x 48 years. lives with his wife and his daughter. Retired school 

teacher.


Hx Alcohol Use:  No


Hx Tobacco Use:  No


Hx Substance Use:  No





Family Psychiatric History


Negative





Allergies-Medications


(Allergen,Severity, Reaction):  


Coded Allergies:  


     Sulfa (Verified  Allergy, Intermediate, vomiting, 7/31/17)


Reported Meds & Prescriptions





Reported Meds & Active Scripts


Active


Dok (Docusate Sodium) 100 Mg Cap 100 Mg PO BID


Reported


Lorazepam 1 Mg Tab 1 Mg PO Q8H PRN


Zolpidem (Zolpidem Tartrate) 10 Mg Tab 10 Mg PO HS


Trazodone (Trazodone HCl) 100 Mg Tablet 100 Mg PO HS





Review of Systems


Except as stated in HPI:  all other systems reviewed are Neg


Gastrointestinal:  COMPLAINS OF: Constipation, Anorexia


Psychiatric:  COMPLAINS OF: Depression





Exam


Alert:  Yes


[place]:  Person (ox4)


Mood:  Depressed


Affect:  Flat


Speech:  Clear, Logical


Eye Contact:  Normal


Memory Intact:  Comment (Not f ormally tetsted)


Hallucinations:  Other (Negative)


Delusions:  No


Suicidal:  Ideation (denies any)


Homicidal:  Ideation (denies any)


Insight/Judgement


Fair. not impaired.





MDM


Medical Decision Making


Medical Record Reviewed:  Yes


Assessment/Plan


72- year old male with history of restless leg syndrome, neuropathy as well as 

depression who  presents to the ED  for psychiatric evaluation as well as for 

evaluation of stomach bloating . He  states that he was seen by his PCP and she 

recommended that he come to the ED for further evaluation. H jailene reports that in 

May his PCP  began treating his symptoms of depression. He was  started on 

Cymbalta. He saw his urologist last week who felt the Cymbalta  was not working 

so he  discontinued the Cymbalta and was prescribed Elavil. Nevertheless,the 

patient never started the Elavil since his insurance does not  cover it. The 

patient is complaining of not eating with reported  weight loss of at  fifty 

pounds since May, impaired sleep despite taking Ativan, Ambien and trazodone 

with only getting 5 hours per night, low level of  energy, anhedonia, social 

isolation, decreased concentration,and attention. Denies  suicidal ideation. 


[facility]  [facility], stabilize mood as 

well as to initiate treatment.





Orders





 Complete Blood Count With Diff (7/31/17 18:18)


Comprehensive Metabolic Panel (7/31/17 18:18)


Thyroid Stimulating Hormone (7/31/17 18:18)


Urinalysis - C+S If Indicated (7/31/17 18:18)


Psych Screen (7/31/17 18:18)


Alcohol (Ethanol) (7/31/17 18:18)


Abdomen, Flat & Upright (7/31/17 )


Sodium Chlor 0.9% 1000 Ml Inj (Ns 1000 M (7/31/17 18:25)


Diet Regular Basic (8/1/17 Breakfast)





Results





Vital Signs








  Date Time  Temp Pulse Resp B/P Pulse Ox O2 Delivery O2 Flow Rate FiO2


 


8/1/17 06:34  78 18 133/75  Room Air  


 


8/1/17 02:21  71 19 149/78 97 Room Air  


 


8/1/17 00:53  79 17 128/74 98 Room Air  


 


7/31/17 19:23  83 14     


 


7/31/17 19:19  83 14 103/55 98 Room Air  


 


7/31/17 18:06  88 14 99/65 98 Room Air  


 


7/31/17 17:22 97.8 102 14 124/66 98   











Laboratory Tests








Test 7/31/17 7/31/17





 18:20 19:16


 


White Blood Count 5.6  


 


Red Blood Count 4.90  


 


Hemoglobin 15.6  


 


Hematocrit 44.6  


 


Mean Corpuscular Volume 91.0  


 


Mean Corpuscular Hemoglobin 31.9  


 


Mean Corpuscular Hemoglobin 35.0  





Concent  


 


Red Cell Distribution Width 14.0  


 


Platelet Count 162  


 


Mean Platelet Volume 8.5  


 


Neutrophils (%) (Auto) 82.2  


 


Lymphocytes (%) (Auto) 8.0  


 


Monocytes (%) (Auto) 9.6  


 


Eosinophils (%) (Auto) 0.0  


 


Basophils (%) (Auto) 0.2  


 


Neutrophils # (Auto) 4.6  


 


Lymphocytes # (Auto) 0.4  


 


Monocytes # (Auto) 0.5  


 


Eosinophils # (Auto) 0.0  


 


Basophils # (Auto) 0.0  


 


CBC Comment DIFF FINAL  


 


Differential Comment   


 


Sodium Level 139  


 


Potassium Level 3.9  


 


Chloride Level 101  


 


Carbon Dioxide Level 28.0  


 


Anion Gap 10  


 


Blood Urea Nitrogen 17  


 


Creatinine 0.96  


 


Estimat Glomerular Filtration 77  





Rate  


 


Random Glucose 91  


 


Calcium Level 9.7  


 


Total Bilirubin 1.4  


 


Aspartate Amino Transf 23  





(AST/SGOT)  


 


Alanine Aminotransferase 47  





(ALT/SGPT)  


 


Alkaline Phosphatase 60  


 


Total Protein 6.6  


 


Albumin 3.8  


 


Thyroid Stimulating Hormone 2.480  





3rd Gen  


 


Ethyl Alcohol Level LESS THAN 3  


 


Urine Color  YELLOW 


 


Urine Turbidity  CLEAR 


 


Urine pH  6.0 


 


Urine Specific Gravity  1.031 


 


Urine Protein  TRACE 


 


Urine Glucose (UA)  NEG 


 


Urine Ketones  TRACE 


 


Urine Occult Blood  NEG 


 


Urine Nitrite  NEG 


 


Urine Bilirubin  NEG 


 


Urine Urobilinogen  4.0 


 


Urine Leukocyte Esterase  NEG 


 


Urine RBC  1 


 


Urine WBC  LESS THAN 1 


 


Microscopic Urinalysis Comment  CULT NOT





  INDICATED











Diagnosis





 Primary Impression:  


 major depressive disorder


 Additional Impression:  


 Constipation





Admitting Information


Admitting Physician Requests:  Admit





Problem Qualifiers








 Additional Impression:  


 Constipation


 Qualified Code:  K59.01 - Slow transit constipation





Jocelyne Burgos [facility] Aug 1, 2017 10:13

## 2017-08-02 VITALS
SYSTOLIC BLOOD PRESSURE: 131 MMHG | OXYGEN SATURATION: 95 % | TEMPERATURE: 98.6 F | HEART RATE: 75 BPM | RESPIRATION RATE: 18 BRPM | DIASTOLIC BLOOD PRESSURE: 56 MMHG

## 2017-08-02 VITALS
OXYGEN SATURATION: 97 % | HEART RATE: 92 BPM | SYSTOLIC BLOOD PRESSURE: 116 MMHG | DIASTOLIC BLOOD PRESSURE: 70 MMHG | RESPIRATION RATE: 20 BRPM | TEMPERATURE: 97.9 F

## 2017-08-02 LAB
ANION GAP SERPL CALC-SCNC: 11 MEQ/L (ref 5–15)
BILIRUB INDIRECT SERPL-MCNC: 1 MG/DL (ref 0–0.8)
BILIRUB SERPL-MCNC: 1.4 MG/DL (ref 0.2–1)
BUN SERPL-MCNC: 9 MG/DL (ref 7–18)
CHLORIDE SERPL-SCNC: 101 MEQ/L (ref 98–107)
GFR SERPLBLD BASED ON 1.73 SQ M-ARVRAT: 71 ML/MIN (ref 89–?)
HCO3 BLD-SCNC: 23.3 MEQ/L (ref 21–32)
HDLC SERPL-MCNC: 58.8 MG/DL (ref 40–60)
HEMOGLOBIN A1A: 0.8 %
HEMOGLOBIN A1B: 1.5 %
HEMOGLOBIN AO: 86.5 %
HEMOGLOBIN LA1C: 1.9 %
HEMOGLOBIN P3: 3.5 %
LDLC SERPL-MCNC: 137 MG/DL (ref 0–99)
POTASSIUM SERPL-SCNC: 3.6 MEQ/L (ref 3.5–5.1)
SODIUM SERPL-SCNC: 135 MEQ/L (ref 136–145)

## 2017-08-02 RX ADMIN — SERTRALINE HYDROCHLORIDE SCH MG: 50 TABLET, FILM COATED ORAL at 13:15

## 2017-08-02 RX ADMIN — DOCUSATE SODIUM SCH MG: 100 CAPSULE, LIQUID FILLED ORAL at 21:10

## 2017-08-02 RX ADMIN — DOCUSATE SODIUM SCH MG: 100 CAPSULE, LIQUID FILLED ORAL at 09:13

## 2017-08-02 NOTE — PD.CONS
HPI


Service


[facility]


Consult Requested By


Psychiatric services


Reason for Consult


Medical management


Primary Care Physician


Edyta Patel MD


Diagnoses:  


History of Present Illness


Written by Janay Rivas PA-C acting as scribe for Dr. Rosario on 8/2/17 at 15

:00. 





This is a 72-year-old male with a past medical history significant for 

peripheral neuropathy, hyperlipidemia, history of melanoma and depression who 

was admitted to the psychiatric unit on a voluntary basis with complaints of 

depression.  Hospitalist services have been consulted for medical management.  

Patient reports decreased appetite.  He reports insomnia and states he has not 

slept in the last 48 hours.  He reports constipation although admits to having 

a bowel movement this morning but it was hard and small.  He denies any other 

medical complaints at this time.  He denies any fever or chills.  He denies any 

nausea, vomiting or abdominal pain.  He denies any chest pain or shortness of 

breath.  He denies any dysuria or hematuria.





Review of Systems


Except as stated in HPI:  all other systems reviewed are Neg





Past Family Social History


Allergies:  


Coded Allergies:  


     Sulfa (Verified  Allergy, Intermediate, vomiting, 7/31/17)


Past Medical History


Peripheral neuropathy


Constipation


History of melanoma


Depression


Dyslipidemia, patient does not take a statin


Past Surgical History


Excision of lipoma


Reported Medications


Lorazepam 1 Mg Tab1 Mg PO Q8H PRN (ANXIETY)  Ref 0


   6/12/17


Zolpidem 10 Mg Tab10 Mg PO HS   Ref 0


   6/12/17


Trazodone 100 Mg [id number] Mg PO HS  #30 TAB  Ref 0


   6/12/17


Active Ordered Medications





 Current Medications








 Medications


  (Trade)  Dose


 Ordered  Sig/Sophia


 Route  Start Time


 Stop Time Status Last Admin


 


  (Tylenol)  650 mg  Q4H  PRN


 PO  8/1/17 11:00


     


 


 


  (Milk Of


 Magnesia Liq)  30 ml  DAILY  PRN


 PO  8/1/17 11:00


     


 


 


  (Mag-Al Plus


 Susp Liq)  30 ml  Q6H  PRN


 PO  8/1/17 11:00


     


 


 


  (Colace)  100 mg  BID


 PO  8/1/17 21:00


    8/2/17 09:13


 


 


  (Desyrel)  150 mg  HS


 PO  8/2/17 21:00


     


 


 


  (Zoloft)  25 mg  DAILY


 PO  8/2/17 13:15


     


 


 


  (KlonoPIN)  1 mg  HS


 PO  8/2/17 21:00


     


 








Family History


Both parents lived to be 


Social History


Patient denies any tobacco use.  Reports rare alcohol consumption.  Denies any 

illicit drug use.





Physical Exam


Vital Signs





 Vital Signs








  Date Time  Temp Pulse Resp B/P Pulse Ox O2 Delivery O2 Flow Rate FiO2


 


8/2/17 05:40 97.9 92 20 116/70 97   


 


8/1/17 22:00 98.2 128 24 109/63 98   








Physical Exam


GENERAL: This is a well-nourished, well-developed patient, in no apparent 

distress.


SKIN: No rashes, ecchymoses or lesions. Cool and dry.


HEAD: Atraumatic. Normocephalic. No temporal or scalp tenderness.


EYES: Pupils equal round and reactive. Extraocular motions intact. No scleral 

icterus. No injection or drainage. 


ENT: Nose without bleeding, purulent drainage or septal hematoma. Throat 

without erythema, tonsillar hypertrophy or exudate. Uvula midline. Airway 

patent.


NECK: Trachea midline. No JVD or lymphadenopathy. Supple, nontender, no 

meningeal signs.


CARDIOVASCULAR: Regular rate and rhythm without murmurs, gallops, or rubs. 


RESPIRATORY: Clear to auscultation. Breath sounds equal bilaterally. No wheezes

, rales, or rhonchi.  


GASTROINTESTINAL: Abdomen soft, non-tender, nondistended. No hepato-splenomegaly

, or palpable masses. No guarding.


MUSCULOSKELETAL: Extremities without clubbing, cyanosis, or edema. No joint 

tenderness, effusion, or edema noted. No calf tenderness. Negative Homans sign 

bilaterally.


NEUROLOGICAL: Awake and alert. Cranial nerves II through XII intact.  Motor and 

sensory grossly within normal limits. Five out of 5 muscle strength in all 

muscle groups.  Normal speech.


Laboratory





Laboratory Tests








Test 8/2/17





 09:53


 


Sodium Level 135 


 


Potassium Level 3.6 


 


Chloride Level 101 


 


Carbon Dioxide Level 23.3 


 


Anion Gap 11 


 


Blood Urea Nitrogen 9 


 


Creatinine 1.03 


 


Estimat Glomerular Filtration 71 





Rate 


 


Random Glucose 104 


 


Hemoglobin A1c 5.1 


 


Calcium Level 9.9 


 


Triglycerides Level 97 


 


Cholesterol Level 215 


 


LDL Cholesterol 137 


 


HDL Cholesterol 58.8 


 


Cholesterol/HDL Ratio 3.65 








Result Diagram:  


7/31/17 1820                                                                   

             8/2/17 0953








Assessment and Plan


Assessment and Plan


72-year-old male with a past medical history significant for peripheral 

neuropathy, hyperlipidemia, history of melanoma and depression who was admitted 

to the psychiatric unit on a voluntary basis with complaints of depression.  

Hospitalist services have been consulted for medical management. 





Depression


Insomnia


Decreased appetite


- Management per psychiatric team





Peripheral neuropathy, chronic per patient report


Patient uses a walker at home to aid with ambulation


- Patient does not take any medication


- PT eval/tx





Tachycardiac


- patient states has with anxiety


- obtain EKG


- encourage po intake


- monitor





Hyponatremia


- mild


- encourage po intake


- repeat lab studies 





Chronic constipation


- Continue with stool softener


- Add MiraLAX daily


- Monitor for BM





Elevated bilirubin


- asymptomatic


- likely due to poor po intake


- obtain bilirubin components





HLD


- Trig 97, , 


- Patient does not take his statin





DVT prophylaxis


- Patient is ambulatory





Thank you kindly for this consultation.  Will gladly follow this patient along 

with you.





This note was transcribed by dewayne Rivas PA-C .  I, Dr. Carol Rosario personally performed the history, physical exam, and medical decision 

making; and confirmed the accuracy of the information in the transcribed note.





Authenticated by Dr. Carol Rosario on 8/2/17 at 15:00.


Discussed Condition With


Patient and nursing staff








Janay Rivas Aug 2, 2017 15:22


Carol Rosario MD Aug 9, 2017 09:28

## 2017-08-02 NOTE — HHI.PR
Addendum to Inpatient Note


Addendum Reason:  Additional Documentation


Additional Information


My discussion with patient and his wife during his past 2 outpatient visits 

revealed that last year patient had what sounds like a manic episode with days 

without sleeping, increased agitation, "road rage" as his wife put it.  He was 

actually discharged from another primary care office at that time due to his 

behavior in the office.  He went without care for several months and then 

around January began a transition from the hyper, agitated behavior to this 

depressed state.  Pts wife noted that over the years he has had his ups and 

downs but never to this extent.  He has been a highly functioning individual 

working in high school and college education yet now isn't even able to 

concentrate to read a book or carry on a conversation appropriately.  I was 

concerned about the potential for underlying bipolar disorder. I have only seen 

him over the past 6 weeks and his behavior at all times has been with a very 

flat affect, slow speech, paucity of words, yet able to appropriately answer 

questions if given time.  Fund of knowledge seemed appropriate.  MD Jorge Melvin,Edyta iJ MD Aug 2, 2017 20:17

## 2017-08-02 NOTE — HHI.HP
Provisional Diagnosis


Admission Date


Aug 1, 2017 at 10:39


Axis I.


Major depression recurrent moderate f 33.1





                               Certification of Person's Competence 


                           To Provide Express and Informed Consent





I have personally examined Neymar Newell , a person being served at 

[facility] on, Aug 2, 2017 13:18.


Express and informed consent means consent voluntarily given in writing, by a 

competent person, after sufficient explanation and disclosure of the subject 

matter involved to enable the person to make a knowing and willful decision 

without any element of force, fraud, deceit, duress, or other form of 

constraint or coercion.





This person is 18 years of age or older, is not now known to be incompetent to 

consent to treatment with a guardian advocate, and does not have a health care 

surrogate or proxy currently making medical treatment decisions.  I have found 

this person to be one of the following:





[xxx] Competent to provide express and informed consent, as defined above, for 

voluntary admission to this facility and is competent to provide express and 

informed consent for treatment.  He/she has the consistent capacity to make 

well reasoned, willful, and knowing decisions concerning his or her medical or 

mental health treatment.  The person fully and consistently understands the 

purpose of the admission for examination/placement and is fully capable of 

personally exercising all rights [place]





[] Incompetent to provide express and informed consent to voluntary admission, 

and this is incompetent to provide express and informed consent to treatment.  

The person must be transferred to involuntary status and a petition for a 

guardian advocate filed with the Circuit Court.





[] Refusing to provide express and informed consent to voluntary admission but 

is competent to provide express and informed consent for treatment.  The person 

must be discharged or transferred to involuntary status.





Form shall be completed within 24 hours of a person's arrival at the receiving 

facility and filed in the clinical record of each person:


1. Admitted on a voluntary basis


2. Permitted to provide express and informed consent to his/her own treatment


3. Allowed to transfer from involuntary to voluntary status


4. Prior to permitting a person to consent to his or her own treatment after 

having been previously found incompetent to consent to treatment.





History of Present Illness


Capacity:  Has Capacity


HPI


Patient is a 72-year-old white male who comes her voluntarily complaining of 

depression with continued significant weight loss over the past month or so 50+ 

pounds.  He feels the medication is not being successful.  There is increased 

stress with the failure of a business was attempting start.  He describes the 

feeling that insomnia with early a.m. awakening.  A.m. anergy.  Decreased 

energy.  Decreased appetite with 50+ pound weight loss.  There is decreased 

concentration and attention.  He says his coping skills continue to remain 

satisfactory.  He does deny voices or visions with this.  Denies any alcohol or 

drug use with it.  He does denies suicidality.  He states he has had a trial of 

Cymbalta by his primary care physician going from 30-60 mg without significant 

response, he does complain primarily of the poor sleep stating he has been on 

Ambien and trazodone with little success.  He states he said issue depression 

many years ago has been on Effexor and perhaps Zoloft.  Patient lives with his 

wife and his only child an adult daughter whom he states  has alcohol related 

issues.  He is retired teacher and .  He denies any physical or 

sexual abuse.  Denies any mental illness in his family.


At this time patient does meet criteria for involuntary inpatient psychiatric 

hospitalization to adjust medications and a moderately responses we will 

increase his at bedtime trazodone 150 mg.  We will discontinue the at bedtime 

Ambien and had 1 mg of Klonopin at at bedtime.  We will also add Zoloft at 25 

mg daily.  Hopeless with fairly short stay and we can referral through to 

mental health services in the community





Review of Systems


Constitutional:  COMPLAINS OF: Fatigue, Weight loss, Change in appetite,  DENIES

: Diaphoretic episodes, Fever, Weight gain, Chills, Dizziness, Night Sweats


Endocrine:  DENIES: Heat/cold intolerance, Polydipsia, Polyuria, Polyphagia


Eyes:  DENIES: Blurred vision, Diplopia, Eye inflammation, Eye pain, Vision loss

, Photosensitivity, Double Vision


Ears, nose, mouth, throat:  DENIES: Tinnitus, Hearing loss, Vertigo, Nasal 

discharge, Oral lesions, Throat pain, Hoarseness, Ear Pain, Running Nose, 

Epistaxis, Sinus Pain, Toothache, Odynophagia


Respiratory:  DENIES: Apneas, Cough, Snoring, Wheezing, Hemoptysis, Sputum 

production, Shortness of breath


Cardiovascular:  DENIES: Chest pain, Palpitations, Syncope, Dyspnea on Exertion

, PND, Lower Extremity Edema, Orthopnea, Claudication


Gastrointestinal:  COMPLAINS OF: Constipation,  DENIES: Abdominal pain, Black 

stools, Bloody stools, Diarrhea, Nausea, Vomiting, Difficulty Swallowing, 

Anorexia


Genitourinary:  DENIES: Sexual dysfunction, Urinary frequency, Urinary 

incontinence, Urgency, Hematuria, Dysuria, Nocturia, Penile Discharge, 

Testicular Pain, Testicular Swelling


Musculoskeletal:  DENIES: Joint pain, Muscle aches, Stiffness, Joint Swelling, 

Back pain, Neck pain


Integumentary:  DENIES: Abnormal pigmentation, Nail changes, Pruritus, Rash


Hematologic/lymphatic:  DENIES: Bruising, Lymphadenopathy


Immunologic/allergic:  DENIES: Eczema, Urticaria


Neurologic:  DENIES: Abnormal gait, Headache, Localized weakness, Paresthesias, 

Seizures, Speech Problems, Tremor, Poor Balance


Psychiatric:  COMPLAINS OF: Depression





Past Psych History


Psychological trauma history


Patient denies physical or sexual abuse


Violence risk - others (6 mos)


Low


Violence risk - self (6 mos)


Low





Substance Abuse History


Drugs/Alcohol past 12 months


States rare social drinker





Past Family Social History


Coded Allergies:  


     Sulfa (Verified  Allergy, Intermediate, vomiting, 7/31/17)


Past Medical History


Please see MedSurg assessment


Active Scripts


Docusate Sodium (Dok)100 Mg [id number] Mg PO BID  #62 CAP


   Prov:Bekah Nuñez MD         7/18/17


Reported Medications


Lorazepam 1 Mg Tab1 Mg PO Q8H PRN (ANXIETY)  Ref 0


   6/12/17


Zolpidem 10 Mg Tab10 Mg PO HS   Ref 0


   6/12/17


Trazodone 100 Mg [id number] Mg PO HS  #30 TAB  Ref 0


   6/12/17


Discontinued Reported Medications


Atorvastatin 40 Mg Tab40 Mg PO HS  #30 TAB  Ref 0


   6/12/17


Discontinued Scripts


[Lactulose] (Lactulose Liq)30 ML SYRP No Conflict Check30 Ml PO BID  #62 ML


   Prov:Bekah Nuñez MD         7/18/17


Lactobacillus Acidophilus (Acidophilus/l-Sporogenes)1 Tab Tab1 Tab PO Q12HR  #

62 TAB


   Prov:Bekah Nuñez MD         7/18/17


Pramipexole 0.125 Mg Tab0.125 Mg PO DAILY  #30 TAB  Ref 0


   Prov:Bekah Nuñez MD         7/18/17


Duloxetine DR 30 Mg Capdr60 Mg PO BID  #62 CAP


   Prov:Bekah Nuñez MD         7/18/17





 Current Medications








 Medications


  (Trade)  Dose


 Ordered  Sig/Sophia


 Route  Start Time


 Stop Time Status Last Admin


 


  (Tylenol)  650 mg  Q4H  PRN


 PO  8/1/17 11:00


     


 


 


  (Milk Of


 Magnesia Liq)  30 ml  DAILY  PRN


 PO  8/1/17 11:00


     


 


 


  (Mag-Al Plus


 Susp Liq)  30 ml  Q6H  PRN


 PO  8/1/17 11:00


     


 


 


  (Colace)  100 mg  BID


 PO  8/1/17 21:00


    8/2/17 09:13


 


 


  (Desyrel)  100 mg  HS


 PO  8/1/17 21:00


    8/1/17 20:57


 








Family History


Patient denies mental health or addictions and family


Social History


Patient discussed his wife and adult daughter whom he states has been alcohol 

abuse issue


Patient's Strengths (min. 2)


Calm cooperative intelligent





Physical Exam


Patient seen screened in ED exam been reviewed and agreed with patient sitting 

quietly in his room with nurse Alanis and becca Mosley present throughout 

session.  He is sitting calmly in no acute distress, neck supple he is in no 

respiratory distress.  No complaints of abdominal pain but said he did have a 

formed difficult stool this morning.  Patient moves all 4 extremities without 

difficulty patient is noted to have some mild tremors in both upper extremities


Vital Signs





 Vital Signs








  Date Time  Temp Pulse Resp B/P Pulse Ox O2 Delivery O2 Flow Rate FiO2


 


8/2/17 05:40 97.9 92 20 116/70 97   


 


8/1/17 10:36      Room Air  











Mental Status Examination


Alert oriented white male appears stated age white hair and light beer neatly 

trimmed cooperative is somewhat guarded and responses at times needing some 

redirection


Appearance


Clean neatly


Speech:  Slow, Tangential (at times)


Orientation:  x3


Memory:  Unremarkable


Thought Process:  Linear


Thought Content:  Unremarkable


Language


Fair


Fund of Knowledge


Fair


Hallucination Type:  None (denies)


Attention and Concentration:  Other (fair)


Suicidal Ideation:  No (denies)


Previous Suicide Attempts:  No


Homicidal Ideation:  No (denies)


Previous Homicide Attempts:  No


Insight:  Fair


Judgment:  WNL


Affect:  Other (decreased range intensity)


Mood:  Euthymic (to dysphoric and somewhat restricted)


Motor Activity:  Abnormal gait-specify (patient using walker)





Assessment & Plan


Problem List:  


(1) Major depressive disorder, recurrent, moderate


ICD Code:  F33.1


Assessment & Plan


Estimated LOS:  days this time patient meets criteria for voluntary inpatient 

psychiatric treatment.  Will have medication adjustments above-the-knee 

monitoring and follow-up.


Discharge Planning


To be determined


Request  Surrog/Guard Advoc?:  Nathan Howard MD Aug 2, 2017 13:32

## 2017-08-03 VITALS
DIASTOLIC BLOOD PRESSURE: 70 MMHG | TEMPERATURE: 98.4 F | OXYGEN SATURATION: 97 % | HEART RATE: 83 BPM | RESPIRATION RATE: 17 BRPM | SYSTOLIC BLOOD PRESSURE: 146 MMHG

## 2017-08-03 VITALS
RESPIRATION RATE: 18 BRPM | DIASTOLIC BLOOD PRESSURE: 50 MMHG | TEMPERATURE: 97.9 F | HEART RATE: 92 BPM | OXYGEN SATURATION: 98 % | SYSTOLIC BLOOD PRESSURE: 113 MMHG

## 2017-08-03 RX ADMIN — LITHIUM CARBONATE SCH MG: 300 TABLET ORAL at 21:10

## 2017-08-03 RX ADMIN — AMITRIPTYLINE HYDROCHLORIDE SCH MG: 25 TABLET, FILM COATED ORAL at 21:11

## 2017-08-03 RX ADMIN — SERTRALINE HYDROCHLORIDE SCH MG: 50 TABLET, FILM COATED ORAL at 08:44

## 2017-08-03 RX ADMIN — DOCUSATE SODIUM SCH MG: 100 CAPSULE, LIQUID FILLED ORAL at 21:10

## 2017-08-03 RX ADMIN — MAGNESIUM HYDROXIDE PRN ML: 400 SUSPENSION ORAL at 08:56

## 2017-08-03 RX ADMIN — DOCUSATE SODIUM SCH MG: 100 CAPSULE, LIQUID FILLED ORAL at 08:44

## 2017-08-03 NOTE — EKG
Date Performed: 08/02/2017       Time Performed: 20:20:30

 

PTAGE:      72 years

 

EKG:      SINUS TACHYCARDIA WITH OCCASIONAL VENTRICULAR PREMATURE COMPLEXES WITH OCCASIONAL SUPRAVENT
RICULAR PREMATURE COMPLEXES MINIMAL ST DEPRESSION ABNORMAL RHYTHM ECG

 

PREVIOUS TRACING       : 07/17/2017 16.17 Since previous tracing, no significant change noted

 

DOCTOR:   Romy Blanton  Interpretating Date/Time  08/03/2017 14:47:06

## 2017-08-03 NOTE — HHI.PYPN
Subjective


Remarks


Dr. Patel's note reviewed appreciated and agreed with.  Patient seen in his 

room with nurse Trent.  Chart reviewed.  Patient compliant medications.  

Considering Dr. Patel's note and patient's history I feel there may be a 

component of mood disorder with this gentleman.  We will add lithium 300 mg at 

bedtime to the regimen.  Considering his strong issues with bowel movements or 

constipation we will discontinue the trazodone and add Elavil 25 mg at at 

bedtime.  Patient denies suicidality today for now continue treatment no change 

will check lithium level over in 3 days





Review of Systems


Constitutional:  DENIES: Diaphoretic episodes, Fatigue, Fever, Weight gain, 

Weight loss, Chills, Dizziness, Change in appetite, Night Sweats


Endocrine:  DENIES: Heat/cold intolerance, Polydipsia, Polyuria, Polyphagia


Eyes:  DENIES: Blurred vision, Diplopia, Eye inflammation, Eye pain, Vision loss

, Photosensitivity, Double Vision


Ears, nose, mouth, throat:  DENIES: Tinnitus, Hearing loss, Vertigo, Nasal 

discharge, Oral lesions, Throat pain, Hoarseness, Ear Pain, Running Nose, 

Epistaxis, Sinus Pain, Toothache, Odynophagia


Respiratory:  DENIES: Apneas, Cough, Snoring, Wheezing, Hemoptysis, Sputum 

production, Shortness of breath


Cardiovascular:  DENIES: Chest pain, Palpitations, Syncope, Dyspnea on Exertion

, PND, Lower Extremity Edema, Orthopnea, Claudication


Gastrointestinal:  COMPLAINS OF: Constipation,  DENIES: Abdominal pain, Black 

stools, Bloody stools, Diarrhea, Nausea, Vomiting, Difficulty Swallowing, 

Anorexia


Genitourinary:  DENIES: Sexual dysfunction, Urinary frequency, Urinary 

incontinence, Urgency, Hematuria, Dysuria, Nocturia, Penile Discharge, 

Testicular Pain, Testicular Swelling


Musculoskeletal:  DENIES: Joint pain, Muscle aches, Stiffness, Joint Swelling, 

Back pain, Neck pain


Integumentary:  DENIES: Abnormal pigmentation, Nail changes, Pruritus, Rash


Hematologic/lymphatic:  DENIES: Bruising, Lymphadenopathy


Immunologic/allergic:  DENIES: Eczema, Urticaria


Neurologic:  DENIES: Abnormal gait, Headache, Localized weakness, Paresthesias, 

Seizures, Speech Problems, Tremor, Poor Balance


Psychiatric:  COMPLAINS OF: Depression, Suicidal Ideation





Objective


Alert:  Yes


[place]:  Person (ox4)


Mood:  Depressed


Affect:  Flat


Memory Intact:  Comment (Not f ormally tetsted)


Hallucinations:  Other (Negative)


Delusions:  No


Delusion Type:  Other (denies)


Suicidal:  Ideation (denies any)


Homicidal:  Ideation (denies any)


Insight/Judgment


Poor


Vitals/IOs





 Vital Signs








  Date Time  Temp Pulse Resp B/P Pulse Ox O2 Delivery O2 Flow Rate FiO2


 


8/3/17 06:06 97.9 92 18 113/50 98   


 


8/1/17 10:36      Room Air  








 Intake and Output








 8/2/17 8/2/17 8/2/17





 07:59 15:59 23:59


 


Intake Total   240 ml


 


Balance   240 ml











Assessment & Plan


Problem List:  


(1) Major depressive disorder, recurrent, moderate


ICD Code:  F33.1


Assessment & Plan


Estimated LOS:  days patient continues depressed also with various somatic 

complaints primarily constipation and some insomnia.  She medication 

adjustments above


Justification for Cont. Inpt.


At this time patient will decompensate into place to lower level of care


Discharge Planning


To be determined


Request HC Surrog/Guard Advoc?:  No








aNthan Bernabe MD Aug 3, 2017 13:26

## 2017-08-04 VITALS
TEMPERATURE: 98.2 F | DIASTOLIC BLOOD PRESSURE: 81 MMHG | OXYGEN SATURATION: 96 % | HEART RATE: 96 BPM | SYSTOLIC BLOOD PRESSURE: 109 MMHG | RESPIRATION RATE: 17 BRPM

## 2017-08-04 RX ADMIN — SERTRALINE HYDROCHLORIDE SCH MG: 50 TABLET, FILM COATED ORAL at 08:17

## 2017-08-04 RX ADMIN — DOCUSATE SODIUM SCH MG: 100 CAPSULE, LIQUID FILLED ORAL at 20:40

## 2017-08-04 RX ADMIN — DOCUSATE SODIUM SCH MG: 100 CAPSULE, LIQUID FILLED ORAL at 08:17

## 2017-08-04 RX ADMIN — LITHIUM CARBONATE SCH MG: 300 TABLET ORAL at 20:39

## 2017-08-04 RX ADMIN — MAGNESIUM HYDROXIDE PRN ML: 400 SUSPENSION ORAL at 11:44

## 2017-08-04 RX ADMIN — AMITRIPTYLINE HYDROCHLORIDE SCH MG: 25 TABLET, FILM COATED ORAL at 20:40

## 2017-08-04 NOTE — HHI.PYPN
Subjective


Remarks


Patient seen in his room with nurse kitty, chart review, patient compliant 

medications.  Patient states she slept better last night with the Elavil 

stating it felt like a normal nights sleep.  He now denies suicidality.  Still 

some focusing on his bowels states he has not had a bowel movement recently was 

given another dose of milk of magnesia earlier today we'll continue to observe





Review of Systems


Except as stated in HPI:  all other systems reviewed are Neg





Objective


Alert:  Yes


[place]:  Person (ox4)


Mood:  Depressed


Affect:  Flat


Memory Intact:  Comment (Not f ormally tetsted)


Hallucinations:  Other (Negative)


Delusions:  No


Delusion Type:  Other (denies)


Suicidal:  Ideation (denies any)


Homicidal:  Ideation (denies any)


Insight/Judgment


Poor to fair


Vitals/IOs





 Vital Signs








  Date Time  Temp Pulse Resp B/P Pulse Ox O2 Delivery O2 Flow Rate FiO2


 


8/4/17 05:36 98.2 96 17 109/81 96   


 


8/1/17 10:36      Room Air  











Assessment & Plan


Problem List:  


(1) Major depressive disorder, recurrent, moderate


ICD Code:  F33.1


Assessment & Plan


Estimated LOS:  days patient continues depressed though improving somewhat, 

continues to focus on his constipation, compliant medications


Justification for Cont. Inpt.


At this time patient would decompensate if placed in a lower level of care


Discharge Planning


To be determined


Request HC Surrog/Guard Advoc?:  Nathan Howard MD Aug 4, 2017 13:16

## 2017-08-05 VITALS
OXYGEN SATURATION: 95 % | SYSTOLIC BLOOD PRESSURE: 120 MMHG | RESPIRATION RATE: 17 BRPM | DIASTOLIC BLOOD PRESSURE: 62 MMHG | TEMPERATURE: 97.8 F | HEART RATE: 88 BPM

## 2017-08-05 VITALS
HEART RATE: 95 BPM | DIASTOLIC BLOOD PRESSURE: 60 MMHG | TEMPERATURE: 97.4 F | OXYGEN SATURATION: 95 % | SYSTOLIC BLOOD PRESSURE: 105 MMHG | RESPIRATION RATE: 18 BRPM

## 2017-08-05 RX ADMIN — AMITRIPTYLINE HYDROCHLORIDE SCH MG: 25 TABLET, FILM COATED ORAL at 21:20

## 2017-08-05 RX ADMIN — SERTRALINE HYDROCHLORIDE SCH MG: 50 TABLET, FILM COATED ORAL at 08:35

## 2017-08-05 RX ADMIN — DOCUSATE SODIUM SCH MG: 100 CAPSULE, LIQUID FILLED ORAL at 21:20

## 2017-08-05 RX ADMIN — LITHIUM CARBONATE SCH MG: 300 TABLET ORAL at 21:20

## 2017-08-05 RX ADMIN — MAGNESIUM HYDROXIDE PRN ML: 400 SUSPENSION ORAL at 08:47

## 2017-08-05 RX ADMIN — DOCUSATE SODIUM SCH MG: 100 CAPSULE, LIQUID FILLED ORAL at 08:35

## 2017-08-05 NOTE — HHI.PYPN
Subjective


Remarks


Patient was seen and case discussed nursing patient is pleasant and cooperative 

with exam.  Mood remains depressed, psychomotor interrogation, blunted affect.  

He remains preoccupied with his bowel movements.  Denies any pain.  Denies 

suicidal ideation intent or plan.  Sleep is intermittent at 4-5 hours.





Objective


Alert:  Yes


[place]:  Person (ox4), Place


Mood:  Depressed


Affect:  Blunted


Memory Intact:  Comment (Not f ormally tetsted)


Hallucinations:  Other (Negative)


Delusions:  No


Delusion Type:  Other (denies)


Suicidal:  Ideation (denies any)


Homicidal:  Ideation (denies any)


Insight/Judgment


Poor


Vitals/IOs





 Vital Signs








  Date Time  Temp Pulse Resp B/P Pulse Ox O2 Delivery O2 Flow Rate FiO2


 


8/5/17 06:21 97.8 88 17 120/62 95   


 


8/1/17 10:36      Room Air  











Assessment & Plan


Problem List:  


(1) Major depressive disorder, recurrent, moderate


ICD Code:  F33.1


Assessment & Plan


Continue current treatment plan


Justification for Cont. Inpt.


Patient would decompensate in a less restrictive setting


Request HC Surrog/Guard Advoc?:  No








Tristan Ramon DO Aug 5, 2017 16:13

## 2017-08-06 VITALS
DIASTOLIC BLOOD PRESSURE: 66 MMHG | HEART RATE: 97 BPM | OXYGEN SATURATION: 98 % | TEMPERATURE: 98.1 F | SYSTOLIC BLOOD PRESSURE: 114 MMHG | RESPIRATION RATE: 18 BRPM

## 2017-08-06 VITALS
HEART RATE: 85 BPM | SYSTOLIC BLOOD PRESSURE: 113 MMHG | RESPIRATION RATE: 17 BRPM | OXYGEN SATURATION: 95 % | TEMPERATURE: 97.9 F | DIASTOLIC BLOOD PRESSURE: 74 MMHG

## 2017-08-06 RX ADMIN — SERTRALINE HYDROCHLORIDE SCH MG: 50 TABLET, FILM COATED ORAL at 09:18

## 2017-08-06 RX ADMIN — DOCUSATE SODIUM SCH MG: 100 CAPSULE, LIQUID FILLED ORAL at 20:43

## 2017-08-06 RX ADMIN — LITHIUM CARBONATE SCH MG: 300 TABLET ORAL at 20:43

## 2017-08-06 RX ADMIN — AMITRIPTYLINE HYDROCHLORIDE SCH MG: 25 TABLET, FILM COATED ORAL at 20:43

## 2017-08-06 RX ADMIN — DOCUSATE SODIUM SCH MG: 100 CAPSULE, LIQUID FILLED ORAL at 09:18

## 2017-08-06 NOTE — HHI.PYPN
Subjective


Remarks


Patient was seen and case discussed with nursing.  Patient is pleasant and 

cooperative with exam.  Continues to be fixated on his bowel movements.  Says 

he has not had any.  Mood is "even."  He is calm and collected during the 

interview.  Thought process is logical and organized.  Says his sleeping has 

improved but is still a challenge since he is on BiPAP at home area denies 

suicidal ideation intent or plan





Objective


Alert:  Yes


[place]:  Person (ox4), Place


Mood:  Calm


Affect:  Blunted


Memory Intact:  Comment (Not f ormally tetsted)


Hallucinations:  Other (Negative)


Delusions:  No


Delusion Type:  Other (denies)


Suicidal:  Ideation (denies any)


Homicidal:  Ideation (denies any)


Insight/Judgment


Fair


Vitals/IOs





 Vital Signs








  Date Time  Temp Pulse Resp B/P Pulse Ox O2 Delivery O2 Flow Rate FiO2


 


8/6/17 05:51 97.9 85 17 113/74 95   











Assessment & Plan


Problem List:  


(1) Major depressive disorder, recurrent, moderate


ICD Code:  F33.1


Assessment & Plan


Milk of magnesia when necessary constipation


Justification for Cont. Inpt.


Patient would decompensate in a less restrictive setting


Request HC Surrog/Guard Advoc?:  No








Tristan Ramon DO Aug 6, 2017 12:35

## 2017-08-07 VITALS
TEMPERATURE: 98.1 F | HEART RATE: 90 BPM | OXYGEN SATURATION: 97 % | SYSTOLIC BLOOD PRESSURE: 135 MMHG | DIASTOLIC BLOOD PRESSURE: 78 MMHG | RESPIRATION RATE: 16 BRPM

## 2017-08-07 VITALS
OXYGEN SATURATION: 96 % | SYSTOLIC BLOOD PRESSURE: 148 MMHG | DIASTOLIC BLOOD PRESSURE: 87 MMHG | HEART RATE: 88 BPM | TEMPERATURE: 98.3 F | RESPIRATION RATE: 20 BRPM

## 2017-08-07 RX ADMIN — DOCUSATE SODIUM SCH MG: 100 CAPSULE, LIQUID FILLED ORAL at 08:45

## 2017-08-07 RX ADMIN — LITHIUM CARBONATE SCH MG: 300 TABLET ORAL at 20:21

## 2017-08-07 RX ADMIN — DOCUSATE SODIUM SCH MG: 100 CAPSULE, LIQUID FILLED ORAL at 20:21

## 2017-08-07 RX ADMIN — SERTRALINE HYDROCHLORIDE SCH MG: 50 TABLET, FILM COATED ORAL at 08:45

## 2017-08-07 RX ADMIN — AMITRIPTYLINE HYDROCHLORIDE SCH MG: 25 TABLET, FILM COATED ORAL at 20:21

## 2017-08-07 NOTE — PD.TTN
Present for Treatment Team


Treatment Team Staff:  Provider (Dr. Bernabe), Nurse (Trent), Psych Therapist 

(BALTA Smith), Other Clinician (Luz, [email or web address]. therapy)





Patient Problems


1. Discharge planning


2. Medication compliance


3. Knowledge deficit


4. Lack of coping skills





Progress Toward Goals


Provider Input:  


Dr. Bernaeb requested an update regarding patient's mental status,


medication compliance, and plan for discharge.


Nurse Input:  


Nurse Trent reported the patient remains fixated on his bowel movements.


Per Trent;s report, the patient is having bowel movements, yet he


continues to report they are unsatisfactory to him.  Patient is compliant


with medications and in good behavioral control.


Psych Therapist Input:  


Counselor reported the patient remains fixated on his bowel movements.


Other Clinican Input:  


Luz reported the patient attends select group activities and presents


with a blunted mood but pleasant.





Documentation


Scribe:  BALTA Smith


Date Resolved:  Aug 7, 2017








Dariana Pierre Aug 7, 2017 11:13

## 2017-08-07 NOTE — HHI.PYPN
Subjective


Remarks


Patient seen in [place] with nurse Trent and medical students Misa and 

Rayna.  Chart reviewed.  Patient compliant medications.  Patient walking 

with walker without difficulty.  He is calm cooperative with me is somewhat 

guarded continues to focus on his constipation.  Though. he is having at least 

some small bowel movement every day abdominal x-ray done on 731 showed mild left

-sided constipation.  It appears patient's mood is improving he denies 

suicidality homicidality voices or visions.  For now continue treatment no 

change check lithium level over in a.m. consider discharge tomorrow





Review of Systems


Except as stated in HPI:  all other systems reviewed are Neg





Objective


Alert:  Yes


[place]:  Person (ox4), Place


Mood:  Calm


Affect:  Blunted


Memory Intact:  Comment (Not f ormally tetsted)


Hallucinations:  Other (Negative)


Delusions:  No


Delusion Type:  Other (denies)


Suicidal:  Ideation (denies any)


Homicidal:  Ideation (denies any)


Insight/Judgment


Poor


Vitals/IOs





 Vital Signs








  Date Time  Temp Pulse Resp B/P Pulse Ox O2 Delivery O2 Flow Rate FiO2


 


8/7/17 05:31 98.1 90 16 135/78 97   











Assessment & Plan


Problem List:  


(1) Major depressive disorder, recurrent, moderate


ICD Code:  F33.1


Assessment & Plan


Estimated LOS:  days patient's depression is lifting, denying suicidality 

homicidality voices or visions.  Still focusing on his bowel movements though 

he does acknowledge small movements every day consider discharge tomorrow


Justification for Cont. Inpt.


Consider discharge tomorrow


Discharge Planning


To be determined


Request HC Surrog/Guard Advoc?:  No








Nathan Bernabe MD Aug 7, 2017 15:30

## 2017-08-08 VITALS
SYSTOLIC BLOOD PRESSURE: 136 MMHG | HEART RATE: 87 BPM | DIASTOLIC BLOOD PRESSURE: 63 MMHG | RESPIRATION RATE: 16 BRPM | TEMPERATURE: 98 F | OXYGEN SATURATION: 98 %

## 2017-08-08 RX ADMIN — SERTRALINE HYDROCHLORIDE SCH MG: 50 TABLET, FILM COATED ORAL at 09:25

## 2017-08-08 RX ADMIN — DOCUSATE SODIUM SCH MG: 100 CAPSULE, LIQUID FILLED ORAL at 09:24

## 2017-08-08 NOTE — HHI.DS
Psychiatry Discharge Summary


Inpatient Psychiatric care?:  Yes


Advance Directive:  No


Reason Not Provided:  refused


Mental Health AdvanceDirective:  No


Health Care Proxy:  No


Admission


Admission Date


Aug 1, 2017 at 10:39


Admission Diagnosis:  


(1) Major depressive disorder, recurrent, moderate


ICD Code:  F33.1


(2) Constipation


ICD Code:  K59.00


Brief History


Patient is a 72-year-old white male who comes her voluntarily complaining of 

depression with continued significant weight loss over the past month or so 50+ 

pounds.  He feels the medication is not being successful.  There is increased 

stress with the failure of a business was attempting start.  He describes the 

feeling that insomnia with early a.m. awakening.  A.m. anergy.  Decreased 

energy.  Decreased appetite with 50+ pound weight loss.  There is decreased 

concentration and attention.  He says his coping skills continue to remain 

satisfactory.  He does deny voices or visions with this.  Denies any alcohol or 

drug use with it.  He does denies suicidality.  He states he has had a trial of 

Cymbalta by his primary care physician going from 30-60 mg without significant 

response, he does complain primarily of the poor sleep stating he has been on 

Ambien and trazodone with little success.  He states he said issue depression 

many years ago has been on Effexor and perhaps Zoloft.  Patient lives with his 

wife and his only child an adult daughter whom he states  has alcohol related 

issues.  He is retired teacher and .  He denies any physical or 

sexual abuse.  Denies any mental illness in his family.


At this time patient does meet criteria for involuntary inpatient psychiatric 

hospitalization to adjust medications and a moderately responses we will 

increase his at bedtime trazodone 150 mg.  We will discontinue the at bedtime 

Ambien and had 1 mg of Klonopin at at bedtime.  We will also add Zoloft at 25 

mg daily.  Hopeless with fairly short stay and we can referral through to 

mental health services in the community


Tobacco Use In Past 30 Days:  5 or More Cigarettes/Day


Alcohol Use:  Never


Hospital Course


Patient's mood improved with the milieu and the medication, however his mood is 

also influenced by his chronic constipation.  Though he did have small bowel 

movements every day he stated they were quite difficult at times needed some 

digital assistance.  However patient denies suicidality homicidality voices or 

visions.  He has had good communication with his wife.  He does her primary 

care physician.  We discussed treatment today with patient along with medical 

student Rayna.  He does agree with discharged today will refer patient to 

when about sources of the community younger [facility], [facility], or 

[facility].  Patient also follow-up with his PCP this week for further treatment 

of his chronic constipation





Results


Blood Pressure


136 / 63





 Vital Signs








  Date Time  Temp Pulse Resp B/P Pulse Ox O2 Delivery O2 Flow Rate FiO2


 


8/8/17 06:28 98.0 87 16 136/63 98   











Laboratory Tests








Test 8/8/17





 07:30


 


Lithium Level 0.3 MEQ/L





 (0.5-1.5)








 Laboratory Results








Test 8/8/17





 07:30


 


Lithium Level 0.3 MEQ/L





 (0.5-1.5)








Summary of Procedures


None done


Imaging





Last Impressions








Abdomen X-Ray 7/31/17 0000 Signed





Impressions: 





 Service Date/Time:  Monday, July 31, 2017 18:42 - CONCLUSION:  Mild left-sided 





 constipation and a few mildly dilated loops of small bowel in the left 





 epigastric region.     Thor Franco MD 








Pending results at discharge:  No





Medications


# of Antipsychotic meds at D/C:  0


Approp Antipsych med options


1 - Minimum of three failed multiple trials of monotherapy.


2 - Documented plan to taper to monotherapy due to previous use of multiple 

meds OR cross-taper in progress at D/C.


3 - Documentation of augmentation of Clozapine.


4 - Justification other than those listed in allowable values 1-3, document here

:





Discharge


Discharge Date:  Aug 8, 2017


Discharge Diagnosis:  


(1) Major depressive disorder, recurrent, moderate


Diagnosis:  Principal


ICD Code:  F33.1


(2) Constipation


Diagnosis:  Secondary


ICD Code:  K59.00


Mental Status Exam at Disch


Alert oriented white male calm quiet, is normal active.  There is mood is 

euthymic to somewhat restricted with decreased range and intensity of his 

affect.  Speech rate and rhythm is slow but goal oriented there no formal 

thought disorders.  No auditory or visual hallucinations.  No delusions.  

Insight and judgment is fair.  Cognition grossly intact


Pt Condition on Discharge:  Stable


Discharge Disposition:  Discharge Home





Discharge Instructions


Diet Instructions:  As Tolerated, No Restrictions


Activities you can perform:  Regular-No Restrictions


Scheduled Appointment:  poor insurance policy follow-up [facility] or 

[facility] or [facility]





Discharge Time


> 30 minutes





Discharge/Advance Care Plan


Health Problems:  


(1) Major depressive disorder, recurrent, moderate


Goals to promote your health


* To prevent worsening of your condition and complications


* To maintain your health at the optimal level


Directions to meet your goals


*** Take your medications as prescribed


***  Follow your dietary instruction


***  Follow activity as directed





***  Keep your appointments as scheduled


***  Take your immunizations and boosters as scheduled


***  If your symptoms worsen call your PCP, if no PCP go to Urgent Care Center 

or Emergency Room ***


***  For 24/7 questions related to your inpatient stay or results of tests 

pending at discharge, please contact Dr. Nathan Bernabe at [phone]


***  Smoking is Dangerous to Your Health. Avoid second hand smoking ***





Problem Qualifiers





(1) Constipation:  


Qualified Code:  K59.00 - Constipation, unspecified constipation type





Nathan Bernabe MD Aug 8, 2017 13:34

## 2018-04-24 ENCOUNTER — HOSPITAL ENCOUNTER (EMERGENCY)
Dept: HOSPITAL 17 - PHED | Age: 73
Discharge: HOME | End: 2018-04-24
Payer: MEDICARE

## 2018-04-24 VITALS
DIASTOLIC BLOOD PRESSURE: 66 MMHG | SYSTOLIC BLOOD PRESSURE: 104 MMHG | RESPIRATION RATE: 18 BRPM | OXYGEN SATURATION: 99 % | HEART RATE: 100 BPM

## 2018-04-24 VITALS
HEART RATE: 108 BPM | TEMPERATURE: 98.5 F | OXYGEN SATURATION: 94 % | RESPIRATION RATE: 20 BRPM | DIASTOLIC BLOOD PRESSURE: 65 MMHG | SYSTOLIC BLOOD PRESSURE: 98 MMHG

## 2018-04-24 VITALS — BODY MASS INDEX: 23.09 KG/M2 | HEIGHT: 76 IN | WEIGHT: 189.6 LBS

## 2018-04-24 DIAGNOSIS — F32.9: ICD-10-CM

## 2018-04-24 DIAGNOSIS — K59.00: Primary | ICD-10-CM

## 2018-04-24 PROCEDURE — 99283 EMERGENCY DEPT VISIT LOW MDM: CPT

## 2018-04-24 PROCEDURE — 74018 RADEX ABDOMEN 1 VIEW: CPT

## 2018-04-24 NOTE — RADRPT
EXAM DATE/TIME:  04/24/2018 10:31 

 

[facility] COMPARISON:     

ABDOMEN KUB ONLY, July 18, 2017, 13:54.

 

                     

INDICATIONS :     

Constipation & abdominal pain x 2 weeks. 

                     

 

MEDICAL HISTORY :     

Hypercholesterolemia.       Diabetic.   

 

SURGICAL HISTORY :     

None.   

 

ENCOUNTER:     

Initial                                        

 

ACUITY:     

2 weeks      

 

PAIN SCORE:     

4/10

 

LOCATION:     

Bilateral  abdomen

 

FINDINGS:     

2 AP spot views of the abdomen. Prominent amount of stool in the ascending colon and transverse colon
. Scattered gas in the nondilated colon. Scattered gas in nondilated loops of small bowel. Phlebolith
s in the pelvis.

 

CONCLUSION:     

Prominent amount of stool in the proximal colon. Question constipation.

 

 

 

 Alvin Romero MD on April 24, 2018 at 10:50           

Board Certified Radiologist.

 This report was verified electronically.

## 2018-04-24 NOTE — PD
HPI


Chief Complaint:  Abdominal Pain


Time Seen by Provider:  10:13


Travel History


International Travel<30 days:  No


Contact w/Intl Traveler<30days:  No


Traveled to known affect area:  No





History of Present Illness


HPI


72-year-old male is concerned that he is constipated.  He says he has not had a 

bowel movement for about 2 weeks.  Does have a history of depression.  He does 

not eat very much.  He is not active.  He has generalized weakness.  He has 

been evaluated for weight loss in the past.  He continues to lose weight.  He 

does have depression and is on medication for that.  He uses a stool softener 

daily.  He has used GoLYTELY.  He says that in the past fleets enema has been 

most successful for him





PFSH


Past Medical History


Anxiety:  Yes


Depression:  Yes


Cancer:  No (per pt)


Cardiovascular Problems:  No (per pt)


High Cholesterol:  Yes


Diabetes:  No (per pt)


Diminished Hearing:  No


Endocrine:  No


Gastrointestinal Disorders:  Yes (Loss of appetite/ weight loss)


Genitourinary:  No


Headaches:  No (per pt)


Immune Disorder:  No


Medical other:  Yes (Neuropathy)


Musculoskeletal:  Yes (RLS)


Psychiatric:  Yes (depression)


Reproductive:  No


Respiratory:  No


Seizures:  No (per pt)


Influenza Vaccination:  Yes


Pregnant?:  Not Pregnant





Past Surgical History


Other Surgery:  Yes (lipoma removed)





Social History


Alcohol Use:  No


Tobacco Use:  No


Substance Use:  No





Allergies-Medications


(Allergen,Severity, Reaction):  


Coded Allergies:  


     Sulfa (Sulfonamide Antibiotics) (Unverified  Allergy, Intermediate, 

vomiting, 4/24/18)


Reported Meds & Prescriptions





Reported Meds & Active Scripts


Active


Zoloft (Sertraline HCl) 25 Mg Tab 25 Mg PO DAILY


Lithium Carbonate 300 Mg Tab 300 Mg PO HS


Dok (Docusate Sodium) 100 Mg Cap 100 Mg PO BID


Klonopin (Clonazepam) 1 Mg Tab 1 Mg PO HS


Amitriptyline (Amitriptyline HCl) 25 Mg Tab 25 Mg PO HS


Dok (Docusate Sodium) 100 Mg Cap 100 Mg PO BID


Reported


Lorazepam 1 Mg Tab 1 Mg PO Q8H PRN


Zolpidem (Zolpidem Tartrate) 10 Mg Tab 10 Mg PO HS


Trazodone (Trazodone HCl) 100 Mg Tablet 100 Mg PO HS








Review of Systems


General / Constitutional:  No: Fever, Chills


Eyes:  No: Diploplia


HENT:  No: Headaches


Cardiovascular:  No: Chest Pain or Discomfort, Palpitations


Respiratory:  No: Cough, Shortness of Breath


Gastrointestinal:  Positive: Abdominal Pain, Constipation


Genitourinary:  No: Urgency, Frequency


Musculoskeletal:  No: Myalgias


Skin:  No Rash


Neurologic:  No: Weakness


Hematologic/Lymphatic:  No: Easy Bruising





Physical Exam


Narrative


GENERAL: Well-developed male


SKIN: Focused skin assessment warm/dry.


HEAD: Atraumatic. Normocephalic. 


EYES: Pupils equal and round. No scleral icterus. No injection or drainage. 


ENT: No nasal bleeding or discharge.  Mucous membranes pink and moist.


NECK: Trachea midline. No JVD. 


CARDIOVASCULAR: Regular rate and rhythm.  No murmur appreciated.


RESPIRATORY: No accessory muscle use. Clear to auscultation. Breath sounds 

equal bilaterally. 


GASTROINTESTINAL: Abdomen soft, non-tender, nondistended. Hepatic and splenic 

margins not palpable.  On rectal exam there is stool present which is firm


MUSCULOSKELETAL: No obvious deformities. No clubbing.  No cyanosis.  No edema. 


NEUROLOGICAL: Awake and alert. No obvious cranial nerve deficits.  Motor 

grossly within normal limits. Normal speech.


PSYCHIATRIC: Depressed mood and affect; insight and judgment normal.





Data


Data


Last Documented VS





Vital Signs








  Date Time  Temp Pulse Resp B/P (MAP) Pulse Ox O2 Delivery O2 Flow Rate FiO2


 


4/24/18 09:56 98.5 108 20 98/65 (76) 94   








Orders





 Orders


Urinalysis - C+S If Indicated (4/24/18 09:51)


Bisacodyl Supp (Dulcolax Supp) (4/24/18 10:30)


Abdomen, Kub Only (4/24/18 10:28)


Fleets Enema (Adult) (Fleets Enema (Adul (4/24/18 11:00)








MDM


Medical Decision Making


Medical Screen Exam Complete:  Yes


Emergency Medical Condition:  Yes


Medical Record Reviewed:  Yes


Differential Diagnosis


Differential includes constipation


Narrative Course


X-ray does show large amount of stool present.  Patient has been using stool 

softeners and used a whole bottle of GoLYTELY.  He says that fleets has helped 

him in the past.  I will order a fleets enema.  Fleets is been done and did 

produce some stool though he does have residual stool.  I will recommend that 

he use GoLYTELY again.  Be released





Diagnosis





 Primary Impression:  


 Constipation


Scripts


Peg-Electrolytes (Golytely 236 gm) 4,000 Ml Soln


4000 ML PO ONCE for Bowel Cleanser, #1 CONTAINER 0 Refills


   Prov: Vel Jung MD         4/24/18


Disposition:  01 DISCHARGE HOME


Condition:  Stable











Vel Jung MD Apr 24, 2018 10:31

## 2018-05-03 ENCOUNTER — HOSPITAL ENCOUNTER (EMERGENCY)
Dept: HOSPITAL 17 - PHED | Age: 73
Discharge: HOME | End: 2018-05-03
Payer: MEDICARE

## 2018-05-03 VITALS
RESPIRATION RATE: 18 BRPM | OXYGEN SATURATION: 97 % | DIASTOLIC BLOOD PRESSURE: 60 MMHG | SYSTOLIC BLOOD PRESSURE: 121 MMHG | HEART RATE: 84 BPM

## 2018-05-03 VITALS
OXYGEN SATURATION: 97 % | TEMPERATURE: 99.1 F | RESPIRATION RATE: 16 BRPM | DIASTOLIC BLOOD PRESSURE: 58 MMHG | SYSTOLIC BLOOD PRESSURE: 114 MMHG | HEART RATE: 73 BPM

## 2018-05-03 VITALS — BODY MASS INDEX: 22.98 KG/M2 | WEIGHT: 188.72 LBS | HEIGHT: 76 IN

## 2018-05-03 DIAGNOSIS — F41.9: ICD-10-CM

## 2018-05-03 DIAGNOSIS — G62.9: ICD-10-CM

## 2018-05-03 DIAGNOSIS — F32.9: ICD-10-CM

## 2018-05-03 DIAGNOSIS — E78.00: ICD-10-CM

## 2018-05-03 DIAGNOSIS — G25.81: ICD-10-CM

## 2018-05-03 DIAGNOSIS — R33.9: Primary | ICD-10-CM

## 2018-05-03 DIAGNOSIS — R19.7: ICD-10-CM

## 2018-05-03 DIAGNOSIS — Z79.899: ICD-10-CM

## 2018-05-03 LAB
AMORPHOUS SEDIMENT, URINE: (no result)
BASOPHILS # BLD AUTO: 0 TH/MM3 (ref 0–0.2)
BASOPHILS NFR BLD: 0.5 % (ref 0–2)
BUN SERPL-MCNC: 14 MG/DL (ref 7–18)
CALCIUM SERPL-MCNC: 9.3 MG/DL (ref 8.5–10.1)
CHLORIDE SERPL-SCNC: 102 MEQ/L (ref 98–107)
COLOR UR: YELLOW
CREAT SERPL-MCNC: 0.77 MG/DL (ref 0.6–1.3)
EOSINOPHIL # BLD: 0 TH/MM3 (ref 0–0.4)
EOSINOPHIL NFR BLD: 0.1 % (ref 0–4)
ERYTHROCYTE [DISTWIDTH] IN BLOOD BY AUTOMATED COUNT: 13 % (ref 11.6–17.2)
GFR SERPLBLD BASED ON 1.73 SQ M-ARVRAT: 100 ML/MIN (ref 89–?)
GLUCOSE SERPL-MCNC: 81 MG/DL (ref 74–106)
GLUCOSE UR STRIP-MCNC: (no result) MG/DL
HCO3 BLD-SCNC: 27.5 MEQ/L (ref 21–32)
HCT VFR BLD CALC: 41.2 % (ref 39–51)
HGB BLD-MCNC: 13.7 GM/DL (ref 13–17)
HGB UR QL STRIP: (no result)
KETONES UR STRIP-MCNC: (no result) MG/DL
LEUKOCYTE ESTERASE UR QL STRIP: (no result) /HPF (ref 0–5)
LYMPHOCYTES # BLD AUTO: 0.7 TH/MM3 (ref 1–4.8)
LYMPHOCYTES NFR BLD AUTO: 15.9 % (ref 9–44)
MCH RBC QN AUTO: 30 PG (ref 27–34)
MCHC RBC AUTO-ENTMCNC: 33.3 % (ref 32–36)
MCV RBC AUTO: 89.9 FL (ref 80–100)
MONOCYTE #: 0.5 TH/MM3 (ref 0–0.9)
MONOCYTES NFR BLD: 11 % (ref 0–8)
MUCOUS THREADS #/AREA URNS LPF: (no result) /LPF
NEUTROPHILS # BLD AUTO: 3.2 TH/MM3 (ref 1.8–7.7)
NEUTROPHILS NFR BLD AUTO: 72.5 % (ref 16–70)
NITRITE UR QL STRIP: (no result)
PLATELET # BLD: 169 TH/MM3 (ref 150–450)
PMV BLD AUTO: 8.1 FL (ref 7–11)
RBC # BLD AUTO: 4.58 MIL/MM3 (ref 4.5–5.9)
RBC #/AREA URNS HPF: (no result) /HPF (ref 0–3)
SODIUM SERPL-SCNC: 135 MEQ/L (ref 136–145)
SP GR UR STRIP: 1.02 (ref 1–1.03)
SQUAMOUS #/AREA URNS HPF: (no result) /HPF (ref 0–5)
URINE LEUKOCYTE ESTERASE: (no result)
WBC # BLD AUTO: 4.4 TH/MM3 (ref 4–11)

## 2018-05-03 PROCEDURE — 85025 COMPLETE CBC W/AUTO DIFF WBC: CPT

## 2018-05-03 PROCEDURE — 51702 INSERT TEMP BLADDER CATH: CPT

## 2018-05-03 PROCEDURE — 80048 BASIC METABOLIC PNL TOTAL CA: CPT

## 2018-05-03 PROCEDURE — 81001 URINALYSIS AUTO W/SCOPE: CPT

## 2018-05-03 NOTE — PD
HPI


Chief Complaint:   Complaint


Time Seen by Provider:  20:37


Travel History


International Travel<30 days:  No


Contact w/Intl Traveler<30days:  No


Traveled to known affect area:  No





History of Present Illness


HPI


Patient 72-year-old male presents emergency department with decreased urination 

for the past 24-48 hours.  Patient recently in this institution for 

constipation was discharged on GoLYTELY he states his been having very runny 

diarrhea.  States she is having difficulty passing urine, states he can get his 

stream started but it lasts only for a few drops and then stopped.  Denies any 

burning or painful urination.  Denies any abdominal pain abdominal fullness 

blood in the stool or blood in the urine.  States symptoms are moderate, 

context and associated signs symptoms as above, gradually worsening





PFSH


Past Medical History


Anxiety:  Yes


Depression:  Yes


Cancer:  No (per pt)


High Cholesterol:  Yes


Diabetes:  No (per pt)


Diminished Hearing:  No


Endocrine:  No


Gastrointestinal Disorders:  Yes (Loss of appetite/ weight loss)


Genitourinary:  No


Headaches:  No (per pt)


Immune Disorder:  No


Musculoskeletal:  Yes (RLS)


Neurologic:  Yes (NEUROPATHY BILAT LOWER EXT.)


Psychiatric:  Yes (depression)


Reproductive:  No


Respiratory:  No


Seizures:  No (per pt)


Tetanus Vaccination:  Unknown


Influenza Vaccination:  Yes





Past Surgical History


Other Surgery:  Yes (lipoma removed)





Social History


Alcohol Use:  No


Tobacco Use:  No


Substance Use:  No





Allergies-Medications


(Allergen,Severity, Reaction):  


Coded Allergies:  


     Sulfa (Sulfonamide Antibiotics) (Unverified  Allergy, Intermediate, 

vomiting, 5/3/18)


Reported Meds & Prescriptions





Reported Meds & Active Scripts


Active


Flomax (Tamsulosin HCl) 0.4 Mg Cap 0.4 Mg PO HS


Zoloft (Sertraline HCl) 25 Mg Tab 25 Mg PO DAILY


Klonopin (Clonazepam) 1 Mg Tab 1 Mg PO HS


Amitriptyline (Amitriptyline HCl) 25 Mg Tab 25 Mg PO HS


Dok (Docusate Sodium) 100 Mg Cap 100 Mg PO BID








Review of Systems


Except as stated in HPI:  all other systems reviewed are Neg





Physical Exam


Narrative


GENERAL: Well-developed well-nourished no obvious


SKIN: Focused skin assessment warm/dry.


HEAD: Atraumatic. Normocephalic. 


EYES: Pupils equal and round. No scleral icterus. No injection or drainage. 


ENT: No nasal bleeding or discharge.  Mucous membranes pink and moist.


NECK: Trachea midline. No JVD. 


CARDIOVASCULAR: Regular rate and rhythm.  No murmur appreciated.


RESPIRATORY: No accessory muscle use. Clear to auscultation. Breath sounds 

equal bilaterally. 


GASTROINTESTINAL: Abdomen soft, non-tender, nondistended. Hepatic and splenic 

margins not palpable.  No rebound no percussive tenderness, no CVA tenderness,


MUSCULOSKELETAL: No obvious deformities. No clubbing.  No cyanosis.  No edema.  

No midline CT or L-spine tenderness.


NEUROLOGICAL: Awake and alert. No obvious cranial nerve deficits.  Motor 

grossly within normal limits. Normal speech.


PSYCHIATRIC: Appropriate mood and affect; insight and judgment normal.





Data


Data


Last Documented VS





Vital Signs








  Date Time  Temp Pulse Resp B/P (MAP) Pulse Ox O2 Delivery O2 Flow Rate FiO2


 


5/3/18 22:45  82 18  97   


 


5/3/18 22:44      Room Air  


 


5/3/18 20:13 99.1       








Orders





 Orders


Urinary Catheter Insert/Apply (5/3/18 20:46)


Basic Metabolic Panel (Bmp) (5/3/18 20:46)


Complete Blood Count With Diff (5/3/18 20:46)


Urinalysis - C+S If Indicated (5/3/18 20:46)


Iv Access Insert/Monitor (5/3/18 20:46)


Sodium Chloride 0.9% Flush (Ns Flush) (5/3/18 21:00)


Ed Discharge Order (5/3/18 21:41)





Labs





Laboratory Tests








Test


  5/3/18


21:00


 


White Blood Count 4.4 TH/MM3 


 


Red Blood Count 4.58 MIL/MM3 


 


Hemoglobin 13.7 GM/DL 


 


Hematocrit 41.2 % 


 


Mean Corpuscular Volume 89.9 FL 


 


Mean Corpuscular Hemoglobin 30.0 PG 


 


Mean Corpuscular Hemoglobin


Concent 33.3 % 


 


 


Red Cell Distribution Width 13.0 % 


 


Platelet Count 169 TH/MM3 


 


Mean Platelet Volume 8.1 FL 


 


Neutrophils (%) (Auto) 72.5 % 


 


Lymphocytes (%) (Auto) 15.9 % 


 


Monocytes (%) (Auto) 11.0 % 


 


Eosinophils (%) (Auto) 0.1 % 


 


Basophils (%) (Auto) 0.5 % 


 


Neutrophils # (Auto) 3.2 TH/MM3 


 


Lymphocytes # (Auto) 0.7 TH/MM3 


 


Monocytes # (Auto) 0.5 TH/MM3 


 


Eosinophils # (Auto) 0.0 TH/MM3 


 


Basophils # (Auto) 0.0 TH/MM3 


 


CBC Comment DIFF FINAL 


 


Differential Comment  


 


Urine Color YELLOW 


 


Urine Turbidity CLEAR 


 


Urine pH 6.5 


 


Urine Specific Gravity 1.020 


 


Urine Protein NEG mg/dL 


 


Urine Glucose (UA) NEG mg/dL 


 


Urine Ketones NEG mg/dL 


 


Urine Occult Blood NEG 


 


Urine Nitrite NEG 


 


Urine Bilirubin NEG 


 


Urine Urobilinogen 0.2 MG/DL 


 


Urine Leukocyte Esterase NEG 


 


Urine RBC 0-3 /hpf 


 


Urine WBC 0-2 /hpf 


 


Urine Squamous Epithelial


Cells 0-5 /hpf 


 


 


Urine Amorphous Sediment FEW 


 


Urine Mucus MOD /lpf 


 


Microscopic Urinalysis Comment


  CULT NOT


INDICATED


 


Blood Urea Nitrogen 14 MG/DL 


 


Creatinine 0.77 MG/DL 


 


Random Glucose 81 MG/DL 


 


Calcium Level 9.3 MG/DL 


 


Sodium Level 135 MEQ/L 


 


Potassium Level 4.0 MEQ/L 


 


Chloride Level 102 MEQ/L 


 


Carbon Dioxide Level 27.5 MEQ/L 


 


Anion Gap 6 MEQ/L 


 


Estimat Glomerular Filtration


Rate 100 ML/MIN 


 











MDM


Medical Decision Making


Medical Screen Exam Complete:  Yes


Emergency Medical Condition:  Yes


Differential Diagnosis


Urinary retention, acute kidney injury, dehydration, electrolyte abnormality


Narrative Course


Patient room to the emergency department, Lora catheter was placed that 

yielded close to 700 cc of urine.  UA was negative, CBC and CMP were 

reassuring.  On reassessment patient states he is feeling better, he then 

related a history that for the past month he has been feeling generally weak 

and has had to rely heavily on his daughter's care.  When asked him if he 

discussed this with his regular physician he states no.  At this time I have no 

medical reason for admission to the hospital, discussed that if he is getting 

to the point where he thinks he is becoming a burden on his family he should 

speak with his regular physician about admitting him to a nursing home.  At 

this time is stable for discharge and has safe discharge and his daughter is 

taken home.





Subjective urinary retention, Lora catheter remained in place, discussed need 

follow-up with a urologist for prostate examination and further workup, Flomax 

risk benefits complications and alternatives were discussed with him,





Diagnosis





 Primary Impression:  


 Urinary retention





***Additional Instructions:  


Follow up with Dr. Gonzales for a prostate checkup.  Recommend follow up within 

one week.  Lora catheter should come out in 7-10 days.  You should do this 

with Dr. Gonzales but you can return to the ER as well.


***Med/Other Pt SpecificInfo:  Prescription(s) given


Scripts


Tamsulosin (Flomax) 0.4 Mg Cap


0.4 MG PO HS for Manage Prostate Problems, #30 CAP 0 Refills


   Prov: Jeff Ramirez MD         5/3/18


Disposition:  01 DISCHARGE HOME


Condition:  Stable











Jeff Ramirez MD May 3, 2018 20:55

## 2018-05-18 ENCOUNTER — HOSPITAL ENCOUNTER (EMERGENCY)
Dept: HOSPITAL 17 - NEPD | Age: 73
LOS: 1 days | Discharge: HOME | End: 2018-05-19
Payer: MEDICARE

## 2018-05-18 VITALS
TEMPERATURE: 97.7 F | DIASTOLIC BLOOD PRESSURE: 74 MMHG | RESPIRATION RATE: 18 BRPM | HEART RATE: 106 BPM | SYSTOLIC BLOOD PRESSURE: 120 MMHG | OXYGEN SATURATION: 97 %

## 2018-05-18 VITALS — HEIGHT: 76 IN | BODY MASS INDEX: 22.82 KG/M2 | WEIGHT: 187.39 LBS

## 2018-05-18 DIAGNOSIS — N30.01: Primary | ICD-10-CM

## 2018-05-18 DIAGNOSIS — R10.30: ICD-10-CM

## 2018-05-18 DIAGNOSIS — F32.9: ICD-10-CM

## 2018-05-18 LAB
ALBUMIN SERPL-MCNC: 3.9 GM/DL (ref 3.4–5)
ALP SERPL-CCNC: 51 U/L (ref 45–117)
ALT SERPL-CCNC: 13 U/L (ref 12–78)
AST SERPL-CCNC: 13 U/L (ref 15–37)
BACTERIA #/AREA URNS HPF: (no result) /HPF
BASOPHILS # BLD AUTO: 0 TH/MM3 (ref 0–0.2)
BASOPHILS NFR BLD: 0.3 % (ref 0–2)
BILIRUB SERPL-MCNC: 0.4 MG/DL (ref 0.2–1)
BUN SERPL-MCNC: 14 MG/DL (ref 7–18)
CALCIUM SERPL-MCNC: 9.5 MG/DL (ref 8.5–10.1)
CHLORIDE SERPL-SCNC: 100 MEQ/L (ref 98–107)
COLOR UR: YELLOW
CREAT SERPL-MCNC: 0.82 MG/DL (ref 0.6–1.3)
EOSINOPHIL # BLD: 0 TH/MM3 (ref 0–0.4)
EOSINOPHIL NFR BLD: 0 % (ref 0–4)
ERYTHROCYTE [DISTWIDTH] IN BLOOD BY AUTOMATED COUNT: 14.3 % (ref 11.6–17.2)
GFR SERPLBLD BASED ON 1.73 SQ M-ARVRAT: 93 ML/MIN (ref 89–?)
GLUCOSE SERPL-MCNC: 87 MG/DL (ref 74–106)
GLUCOSE UR STRIP-MCNC: (no result) MG/DL
HCO3 BLD-SCNC: 25.4 MEQ/L (ref 21–32)
HCT VFR BLD CALC: 41.7 % (ref 39–51)
HGB BLD-MCNC: 14.2 GM/DL (ref 13–17)
HGB UR QL STRIP: (no result)
INR PPP: 1 RATIO
KETONES UR STRIP-MCNC: (no result) MG/DL
LYMPHOCYTES # BLD AUTO: 0.5 TH/MM3 (ref 1–4.8)
LYMPHOCYTES NFR BLD AUTO: 7.3 % (ref 9–44)
MCH RBC QN AUTO: 30.5 PG (ref 27–34)
MCHC RBC AUTO-ENTMCNC: 34.1 % (ref 32–36)
MCV RBC AUTO: 89.6 FL (ref 80–100)
MONOCYTE #: 0.6 TH/MM3 (ref 0–0.9)
MONOCYTES NFR BLD: 9.8 % (ref 0–8)
NEUTROPHILS # BLD AUTO: 5.3 TH/MM3 (ref 1.8–7.7)
NEUTROPHILS NFR BLD AUTO: 82.6 % (ref 16–70)
NITRITE UR QL STRIP: (no result)
PLATELET # BLD: 184 TH/MM3 (ref 150–450)
PMV BLD AUTO: 8.1 FL (ref 7–11)
PROT SERPL-MCNC: 6.9 GM/DL (ref 6.4–8.2)
PROTHROMBIN TIME: 10.3 SEC (ref 9.8–11.6)
RBC # BLD AUTO: 4.66 MIL/MM3 (ref 4.5–5.9)
SODIUM SERPL-SCNC: 135 MEQ/L (ref 136–145)
SP GR UR STRIP: 1.02 (ref 1–1.03)
URINE LEUKOCYTE ESTERASE: (no result)
WBC # BLD AUTO: 6.4 TH/MM3 (ref 4–11)

## 2018-05-18 PROCEDURE — 85025 COMPLETE CBC W/AUTO DIFF WBC: CPT

## 2018-05-18 PROCEDURE — 87186 SC STD MICRODIL/AGAR DIL: CPT

## 2018-05-18 PROCEDURE — 96365 THER/PROPH/DIAG IV INF INIT: CPT

## 2018-05-18 PROCEDURE — 87077 CULTURE AEROBIC IDENTIFY: CPT

## 2018-05-18 PROCEDURE — 74177 CT ABD & PELVIS W/CONTRAST: CPT

## 2018-05-18 PROCEDURE — 80053 COMPREHEN METABOLIC PANEL: CPT

## 2018-05-18 PROCEDURE — 87086 URINE CULTURE/COLONY COUNT: CPT

## 2018-05-18 PROCEDURE — 81001 URINALYSIS AUTO W/SCOPE: CPT

## 2018-05-18 PROCEDURE — 99284 EMERGENCY DEPT VISIT MOD MDM: CPT

## 2018-05-18 PROCEDURE — 85610 PROTHROMBIN TIME: CPT

## 2018-05-18 PROCEDURE — 85730 THROMBOPLASTIN TIME PARTIAL: CPT

## 2018-05-18 NOTE — PD
HPI


Chief Complaint:  Flank/Kidney Pain


Time Seen by Provider:  19:56


Travel History


International Travel<30 days:  No


Contact w/Intl Traveler<30days:  No


Traveled to known affect area:  No





History of Present Illness


HPI


72-year-old male here for evaluation of dysuria, inability to urinate, lower 

abdominal/suprapubic pain, diarrhea.  The patient presented to [facility] 

emergency department on 5/3/18 and was diagnosed with urinary retention, 

discharged home with a Lora catheter.  This was removed 2 days ago.  Initially 

he was able to urinate, however he has not been able to urinate all day today.  

Lower abdominal pain described as cramping.  He has had several episodes of 

loose bowel movements and states that he is taking laxatives.  No vomiting.  No 

fevers.





PFSH


Past Medical History


Anxiety:  Yes


Depression:  Yes


Cancer:  No (per pt)


High Cholesterol:  Yes


Diabetes:  No (per pt)


Diminished Hearing:  No


Endocrine:  No


Gastrointestinal Disorders:  Yes (Loss of appetite/ weight loss)


Genitourinary:  No


Headaches:  No (per pt)


Immune Disorder:  No


Musculoskeletal:  Yes (RLS)


Neurologic:  Yes (NEUROPATHY BILAT LOWER EXT.)


Psychiatric:  Yes (depression)


Reproductive:  No


Respiratory:  No


Seizures:  No (per pt)





Past Surgical History


Other Surgery:  Yes (lipoma removed)





Social History


Alcohol Use:  No


Tobacco Use:  No


Substance Use:  No





Allergies-Medications


(Allergen,Severity, Reaction):  


Coded Allergies:  


     Sulfa (Sulfonamide Antibiotics) (Unverified  Allergy, Intermediate, 

vomiting, 5/18/18)


Reported Meds & Prescriptions





Reported Meds & Active Scripts


Active


Flomax (Tamsulosin HCl) 0.4 Mg Cap 0.4 Mg PO HS


Zoloft (Sertraline HCl) 25 Mg Tab 25 Mg PO DAILY


Klonopin (Clonazepam) 1 Mg Tab 1 Mg PO HS


Amitriptyline (Amitriptyline HCl) 25 Mg Tab 25 Mg PO HS


Dok (Docusate Sodium) 100 Mg Cap 100 Mg PO BID


Reported


Miralax Powder (Polyethylene Glycol 3350 Powder) 17 Gm Powd Gm PO DAILY


     Mix and dissolve one measuring cap-ful (17 grams) in water or juice.








Review of Systems


Except as stated in HPI:  all other systems reviewed are Neg





Physical Exam


Narrative


GENERAL: Well-developed, well-nourished, awake, alert, no apparent distress, 

diffuse resting tremors.


SKIN: Focused skin assessment warm/dry.


HEAD: Atraumatic. Normocephalic. 


EYES: Pupils equal and round. No scleral icterus. No injection or drainage. 


ENT: No nasal bleeding or discharge.  Mucous membranes pink and moist.


NECK: Trachea midline. No JVD. 


CARDIOVASCULAR: Regular rate and rhythm.  No murmur appreciated.


RESPIRATORY: No accessory muscle use. Clear to auscultation. Breath sounds 

equal bilaterally. 


GASTROINTESTINAL: Abdomen soft, nondistended.  Mild suprapubic, right lower 

quadrant, and left lower quadrant tenderness without peritoneal signs.  No 

hernias.  Normal bowel sounds.


: Within normal limits without swelling or masses, no tenderness.


MUSCULOSKELETAL: No obvious deformities. No clubbing.  No cyanosis.  No edema. 


NEUROLOGICAL: Awake and alert. No obvious cranial nerve deficits.  Motor 

grossly within normal limits. Normal speech.


PSYCHIATRIC: Appropriate mood and affect; insight and judgment normal.





Data


Data


Last Documented VS





Vital Signs








  Date Time  Temp Pulse Resp B/P (MAP) Pulse Ox O2 Delivery O2 Flow Rate FiO2


 


5/18/18 15:36 97.7 106 18 120/74 (89) 97   








Orders





 Orders


Complete Blood Count With Diff (5/18/18 20:09)


Comprehensive Metabolic Panel (5/18/18 20:09)


Prothrombin Time / Inr (Pt) (5/18/18 20:09)


Act Partial Throm Time (Ptt) (5/18/18 20:09)


Urinalysis - C+S If Indicated (5/18/18 20:09)


Iv Access Insert/Monitor (5/18/18 20:09)


Ecg Monitoring (5/18/18 20:09)


Oximetry (5/18/18 20:09)


Sodium Chloride 0.9% Flush (Ns Flush) (5/18/18 20:15)


Urinary Catheter Insert/Apply (5/18/18 20:09)


Ct Abd/Pel W Iv Contrast(Rout) (5/18/18 20:14)


Urine Culture (5/18/18 20:30)


Ceftriaxone Inj (Rocephin Inj) (5/18/18 21:30)


Iohexol 350 Inj (Omnipaque 350 Inj) (5/18/18 23:03)


Ciprofloxacin (Cipro) (5/18/18 23:30)





Labs





Laboratory Tests








Test


  5/18/18


20:30


 


White Blood Count 6.4 TH/MM3 


 


Red Blood Count 4.66 MIL/MM3 


 


Hemoglobin 14.2 GM/DL 


 


Hematocrit 41.7 % 


 


Mean Corpuscular Volume 89.6 FL 


 


Mean Corpuscular Hemoglobin 30.5 PG 


 


Mean Corpuscular Hemoglobin


Concent 34.1 % 


 


 


Red Cell Distribution Width 14.3 % 


 


Platelet Count 184 TH/MM3 


 


Mean Platelet Volume 8.1 FL 


 


Neutrophils (%) (Auto) 82.6 % 


 


Lymphocytes (%) (Auto) 7.3 % 


 


Monocytes (%) (Auto) 9.8 % 


 


Eosinophils (%) (Auto) 0.0 % 


 


Basophils (%) (Auto) 0.3 % 


 


Neutrophils # (Auto) 5.3 TH/MM3 


 


Lymphocytes # (Auto) 0.5 TH/MM3 


 


Monocytes # (Auto) 0.6 TH/MM3 


 


Eosinophils # (Auto) 0.0 TH/MM3 


 


Basophils # (Auto) 0.0 TH/MM3 


 


CBC Comment DIFF FINAL 


 


Differential Comment  


 


Prothrombin Time 10.3 SEC 


 


Prothromb Time International


Ratio 1.0 RATIO 


 


 


Activated Partial


Thromboplast Time 24.8 SEC 


 


 


Urine Color YELLOW 


 


Urine Turbidity HAZY 


 


Urine pH 6.0 


 


Urine Specific Gravity 1.022 


 


Urine Protein 30 mg/dL 


 


Urine Glucose (UA) NEG mg/dL 


 


Urine Ketones NEG mg/dL 


 


Urine Occult Blood MOD 


 


Urine Nitrite NEG 


 


Urine Bilirubin NEG 


 


Urine Urobilinogen


  LESS THAN 2.0


MG/DL


 


Urine Leukocyte Esterase LARGE 


 


Urine RBC 29 /hpf 


 


Urine WBC  /hpf 


 


Urine Bacteria MANY /hpf 


 


Microscopic Urinalysis Comment


  CATH-CULTURE


IND


 


Blood Urea Nitrogen 14 MG/DL 


 


Creatinine 0.82 MG/DL 


 


Random Glucose 87 MG/DL 


 


Total Protein 6.9 GM/DL 


 


Albumin 3.9 GM/DL 


 


Calcium Level 9.5 MG/DL 


 


Alkaline Phosphatase 51 U/L 


 


Aspartate Amino Transf


(AST/SGOT) 13 U/L 


 


 


Alanine Aminotransferase


(ALT/SGPT) 13 U/L 


 


 


Total Bilirubin 0.4 MG/DL 


 


Sodium Level 135 MEQ/L 


 


Potassium Level 4.7 MEQ/L 


 


Chloride Level 100 MEQ/L 


 


Carbon Dioxide Level 25.4 MEQ/L 


 


Anion Gap 10 MEQ/L 


 


Estimat Glomerular Filtration


Rate 93 ML/MIN 


 











[facility]


Medical Decision Making


Medical Screen Exam Complete:  Yes


Emergency Medical Condition:  Yes


Differential Diagnosis


Urinary retention, UTI, cystitis, prostatitis, colitis, diverticulitis


Narrative Course


Bedside transabdominal ultrasound using a curvilinear ultrasound probe shows a 

full bladder.





Just before the nurse was about to put in a Lora catheter, the patient was 

able to urinate about 250 cc of urine.





Vital signs reviewed.





CBC: WBC 6.4, hemoglobin 14.2, hematocrit 41.7, platelets 184, neutrophils 83%.


CMP is unremarkable.





UA is suggestive of UTI with moderate occult blood, large leukocyte esterase, 

29 RBCs, innumerable WBCs, many bacteria.





CT abdomen pelvis:


CONCLUSION:     


1. Bladder wall thickening with some stranding around the bladder which may be 

characteristic of a cystitis. No abscess. 


2. Remote granulomatous disease. Advanced degenerative change of the spine.





The patient was given 1 g of IV Rocephin.





Both the patient and the patient's significant other were made aware of all 

findings.  Patient is resting comfortably.  He is having dysuria and increased 

urinary frequency.  He is able to urinate and does not appear to be in urinary 

retention.  He was given 1 g of IV Rocephin and will be started on Cipro.  He 

was advised to follow-up with his primary care physician or his urologist Dr. Gonzales in the next 3 days.  He is stable for discharge home with outpatient 

follow-up.  He was advised on when to return to the emergency department.  He 

verbalizes understanding and agreement with plan.





Procedures


**Procedure Narrative**


Bedside transabdominal ultrasound:


Using the curvilinear ultrasound probe, a bedside ultrasound was performed by 

me and shows a full/distended bladder.





Diagnosis





 Primary Impression:  


 UTI (urinary tract infection)


 Qualified Codes:  N30.01 - Acute cystitis with hematuria


Referrals:  


Patrick Gonzales MD


3 days





Primary Care Physician


3 days





***Additional Instructions:  


Follow-up with your primary care physician this week.


Follow-up with your urologist this week.


Take antibiotic as prescribed.


Return to the emergency department for worsening symptoms or any other concerns.


Scripts


Ciprofloxacin (Cipro) 500 Mg Tab


500 MG PO BID for Infection for 14 Days, #28 TAB 0 Refills


   Prov: Cosme Lindo MD         5/18/18


Disposition:  01 DISCHARGE HOME


Condition:  Stable











Cosme Lindo MD May 18, 2018 20:21

## 2018-05-18 NOTE — RADRPT
EXAM DATE/TIME:  05/18/2018 22:43 

 

[facility] COMPARISON:     

No previous studies available for comparison.

 

 

INDICATIONS :     

Abdominal pain and constipation.  Patient had indwelling catheter removed recently and also complains
 of painful urination.

                      

 

IV CONTRAST:     

97 cc Omnipaque 350 (iohexol) IV 

 

 

ORAL CONTRAST:      

No oral contrast ingested.

                      

 

RADIATION DOSE:     

7.37 CTDIvol (mGy) 

 

 

MEDICAL HISTORY :     

Cardiovascular disease. Seizures. Diabetes mellitus type 2.Neuropathy

 

SURGICAL HISTORY :      

None. 

 

ENCOUNTER:      

Initial

 

ACUITY:      

2 days

 

PAIN SCALE:      

7/10

 

LOCATION:         

abdomen

 

TECHNIQUE:     

Volumetric scanning of the abdomen and pelvis was performed.  Using automated exposure control and ad
justment of the mA and/or kV according to patient size, radiation dose was kept as low as reasonably 
achievable to obtain optimal diagnostic quality images.  DICOM format image data is available electro
nically for review and comparison.  

 

FINDINGS:     

Lung bases clear except minimal scarring. No acute findings in the liver spleen, adrenals, kidneys or
 pancreas. Calcified granulomata spleen.

 

No calcified gallstones. No biliary ductal dilatation. There is a mild ileus. No free fluid or free a
ir.

Mild scoliosis. The bladder wall appears thickened with some mild stranding around the bladder sugges
ting a cystitis.

 

CONCLUSION:     

1. Bladder wall thickening with some stranding around the bladder which may be characteristic of a cy
stitis. No abscess. 

2. Remote granulomatous disease. Advanced degenerative change of the spine.

 

 

 

 Ilya Almaraz MD on May 18, 2018 at 22:57           

Board Certified Radiologist.

 This report was verified electronically.
